# Patient Record
Sex: MALE | Race: WHITE | NOT HISPANIC OR LATINO | Employment: FULL TIME | URBAN - METROPOLITAN AREA
[De-identification: names, ages, dates, MRNs, and addresses within clinical notes are randomized per-mention and may not be internally consistent; named-entity substitution may affect disease eponyms.]

---

## 2024-07-10 ENCOUNTER — HOSPITAL ENCOUNTER (EMERGENCY)
Facility: CLINIC | Age: 24
Discharge: HOME | End: 2024-07-11
Attending: EMERGENCY MEDICINE | Admitting: EMERGENCY MEDICINE
Payer: COMMERCIAL

## 2024-07-10 VITALS
SYSTOLIC BLOOD PRESSURE: 117 MMHG | BODY MASS INDEX: 32.01 KG/M2 | OXYGEN SATURATION: 99 % | WEIGHT: 236.33 LBS | HEART RATE: 88 BPM | HEIGHT: 72 IN | DIASTOLIC BLOOD PRESSURE: 67 MMHG | RESPIRATION RATE: 18 BRPM | TEMPERATURE: 97.1 F

## 2024-07-10 DIAGNOSIS — K52.9 ACUTE GASTROENTERITIS: ICD-10-CM

## 2024-07-10 DIAGNOSIS — E86.0 DEHYDRATION: ICD-10-CM

## 2024-07-10 LAB
ALBUMIN SERPL BCG-MCNC: 4.9 G/DL (ref 3.5–5.2)
ALBUMIN UR-MCNC: NEGATIVE MG/DL
ALP SERPL-CCNC: 76 U/L (ref 40–150)
ALT SERPL W P-5'-P-CCNC: 44 U/L (ref 0–70)
ANION GAP SERPL CALCULATED.3IONS-SCNC: 11 MMOL/L (ref 7–15)
APPEARANCE UR: CLEAR
AST SERPL W P-5'-P-CCNC: 30 U/L (ref 0–45)
BASOPHILS # BLD AUTO: 0.1 10E3/UL (ref 0–0.2)
BASOPHILS NFR BLD AUTO: 0 %
BILIRUB SERPL-MCNC: 0.9 MG/DL
BILIRUB UR QL STRIP: NEGATIVE
BUN SERPL-MCNC: 17.3 MG/DL (ref 6–20)
CALCIUM SERPL-MCNC: 9.7 MG/DL (ref 8.6–10)
CHLORIDE SERPL-SCNC: 98 MMOL/L (ref 98–107)
COLOR UR AUTO: NORMAL
CREAT SERPL-MCNC: 1.11 MG/DL (ref 0.67–1.17)
DEPRECATED HCO3 PLAS-SCNC: 26 MMOL/L (ref 22–29)
EGFRCR SERPLBLD CKD-EPI 2021: >90 ML/MIN/1.73M2
EOSINOPHIL # BLD AUTO: 0.1 10E3/UL (ref 0–0.7)
EOSINOPHIL NFR BLD AUTO: 1 %
ERYTHROCYTE [DISTWIDTH] IN BLOOD BY AUTOMATED COUNT: 11.7 % (ref 10–15)
FLUAV RNA SPEC QL NAA+PROBE: NEGATIVE
FLUBV RNA RESP QL NAA+PROBE: NEGATIVE
GLUCOSE SERPL-MCNC: 114 MG/DL (ref 70–99)
GLUCOSE UR STRIP-MCNC: NEGATIVE MG/DL
HCT VFR BLD AUTO: 51.2 % (ref 40–53)
HGB BLD-MCNC: 18.2 G/DL (ref 13.3–17.7)
HGB UR QL STRIP: NEGATIVE
HOLD SPECIMEN: NORMAL
HOLD SPECIMEN: NORMAL
IMM GRANULOCYTES # BLD: 0 10E3/UL
IMM GRANULOCYTES NFR BLD: 0 %
KETONES UR STRIP-MCNC: NEGATIVE MG/DL
LEUKOCYTE ESTERASE UR QL STRIP: NEGATIVE
LIPASE SERPL-CCNC: 25 U/L (ref 13–60)
LYMPHOCYTES # BLD AUTO: 0.7 10E3/UL (ref 0.8–5.3)
LYMPHOCYTES NFR BLD AUTO: 5 %
MCH RBC QN AUTO: 31.5 PG (ref 26.5–33)
MCHC RBC AUTO-ENTMCNC: 35.5 G/DL (ref 31.5–36.5)
MCV RBC AUTO: 89 FL (ref 78–100)
MONOCYTES # BLD AUTO: 0.9 10E3/UL (ref 0–1.3)
MONOCYTES NFR BLD AUTO: 7 %
NEUTROPHILS # BLD AUTO: 12.5 10E3/UL (ref 1.6–8.3)
NEUTROPHILS NFR BLD AUTO: 88 %
NITRATE UR QL: NEGATIVE
NRBC # BLD AUTO: 0 10E3/UL
NRBC BLD AUTO-RTO: 0 /100
PH UR STRIP: 7 [PH] (ref 5–7)
PLATELET # BLD AUTO: 276 10E3/UL (ref 150–450)
POTASSIUM SERPL-SCNC: 4.7 MMOL/L (ref 3.4–5.3)
PROT SERPL-MCNC: 8 G/DL (ref 6.4–8.3)
RBC # BLD AUTO: 5.77 10E6/UL (ref 4.4–5.9)
RBC URINE: 1 /HPF
RSV RNA SPEC NAA+PROBE: NEGATIVE
SARS-COV-2 RNA RESP QL NAA+PROBE: NEGATIVE
SODIUM SERPL-SCNC: 135 MMOL/L (ref 135–145)
SP GR UR STRIP: 1.03 (ref 1–1.03)
UROBILINOGEN UR STRIP-MCNC: NORMAL MG/DL
WBC # BLD AUTO: 14.3 10E3/UL (ref 4–11)
WBC URINE: <1 /HPF

## 2024-07-10 PROCEDURE — 99284 EMERGENCY DEPT VISIT MOD MDM: CPT | Mod: 25

## 2024-07-10 PROCEDURE — 250N000011 HC RX IP 250 OP 636: Performed by: EMERGENCY MEDICINE

## 2024-07-10 PROCEDURE — 81001 URINALYSIS AUTO W/SCOPE: CPT | Performed by: EMERGENCY MEDICINE

## 2024-07-10 PROCEDURE — 81001 URINALYSIS AUTO W/SCOPE: CPT | Performed by: STUDENT IN AN ORGANIZED HEALTH CARE EDUCATION/TRAINING PROGRAM

## 2024-07-10 PROCEDURE — 83690 ASSAY OF LIPASE: CPT | Performed by: EMERGENCY MEDICINE

## 2024-07-10 PROCEDURE — 96361 HYDRATE IV INFUSION ADD-ON: CPT

## 2024-07-10 PROCEDURE — 80053 COMPREHEN METABOLIC PANEL: CPT | Performed by: EMERGENCY MEDICINE

## 2024-07-10 PROCEDURE — 80053 COMPREHEN METABOLIC PANEL: CPT | Performed by: STUDENT IN AN ORGANIZED HEALTH CARE EDUCATION/TRAINING PROGRAM

## 2024-07-10 PROCEDURE — 83690 ASSAY OF LIPASE: CPT | Performed by: STUDENT IN AN ORGANIZED HEALTH CARE EDUCATION/TRAINING PROGRAM

## 2024-07-10 PROCEDURE — 85025 COMPLETE CBC W/AUTO DIFF WBC: CPT | Performed by: EMERGENCY MEDICINE

## 2024-07-10 PROCEDURE — 87637 SARSCOV2&INF A&B&RSV AMP PRB: CPT | Performed by: EMERGENCY MEDICINE

## 2024-07-10 PROCEDURE — 258N000003 HC RX IP 258 OP 636: Performed by: EMERGENCY MEDICINE

## 2024-07-10 PROCEDURE — 36415 COLL VENOUS BLD VENIPUNCTURE: CPT | Performed by: STUDENT IN AN ORGANIZED HEALTH CARE EDUCATION/TRAINING PROGRAM

## 2024-07-10 PROCEDURE — 87637 SARSCOV2&INF A&B&RSV AMP PRB: CPT | Performed by: STUDENT IN AN ORGANIZED HEALTH CARE EDUCATION/TRAINING PROGRAM

## 2024-07-10 PROCEDURE — 96374 THER/PROPH/DIAG INJ IV PUSH: CPT

## 2024-07-10 PROCEDURE — 96375 TX/PRO/DX INJ NEW DRUG ADDON: CPT

## 2024-07-10 PROCEDURE — 250N000011 HC RX IP 250 OP 636: Performed by: STUDENT IN AN ORGANIZED HEALTH CARE EDUCATION/TRAINING PROGRAM

## 2024-07-10 PROCEDURE — 85025 COMPLETE CBC W/AUTO DIFF WBC: CPT | Performed by: STUDENT IN AN ORGANIZED HEALTH CARE EDUCATION/TRAINING PROGRAM

## 2024-07-10 RX ORDER — ONDANSETRON 4 MG/1
4 TABLET, ORALLY DISINTEGRATING ORAL ONCE
Status: COMPLETED | OUTPATIENT
Start: 2024-07-10 | End: 2024-07-10

## 2024-07-10 RX ORDER — ONDANSETRON 2 MG/ML
4 INJECTION INTRAMUSCULAR; INTRAVENOUS ONCE
Status: COMPLETED | OUTPATIENT
Start: 2024-07-10 | End: 2024-07-10

## 2024-07-10 RX ORDER — ONDANSETRON 4 MG/1
4 TABLET, ORALLY DISINTEGRATING ORAL EVERY 6 HOURS PRN
Qty: 10 TABLET | Refills: 0 | Status: SHIPPED | OUTPATIENT
Start: 2024-07-10 | End: 2024-07-13

## 2024-07-10 RX ADMIN — ONDANSETRON 4 MG: 4 TABLET, ORALLY DISINTEGRATING ORAL at 21:00

## 2024-07-10 RX ADMIN — SODIUM CHLORIDE 1000 ML: 9 INJECTION, SOLUTION INTRAVENOUS at 23:42

## 2024-07-10 RX ADMIN — ONDANSETRON 4 MG: 2 INJECTION INTRAMUSCULAR; INTRAVENOUS at 23:42

## 2024-07-10 ASSESSMENT — COLUMBIA-SUICIDE SEVERITY RATING SCALE - C-SSRS
6. HAVE YOU EVER DONE ANYTHING, STARTED TO DO ANYTHING, OR PREPARED TO DO ANYTHING TO END YOUR LIFE?: NO
1. IN THE PAST MONTH, HAVE YOU WISHED YOU WERE DEAD OR WISHED YOU COULD GO TO SLEEP AND NOT WAKE UP?: NO
2. HAVE YOU ACTUALLY HAD ANY THOUGHTS OF KILLING YOURSELF IN THE PAST MONTH?: NO

## 2024-07-10 ASSESSMENT — ACTIVITIES OF DAILY LIVING (ADL): ADLS_ACUITY_SCORE: 33

## 2024-07-11 PROCEDURE — 96361 HYDRATE IV INFUSION ADD-ON: CPT

## 2024-07-11 PROCEDURE — 250N000011 HC RX IP 250 OP 636: Performed by: EMERGENCY MEDICINE

## 2024-07-11 PROCEDURE — 258N000003 HC RX IP 258 OP 636: Performed by: EMERGENCY MEDICINE

## 2024-07-11 RX ORDER — METOCLOPRAMIDE HYDROCHLORIDE 5 MG/ML
10 INJECTION INTRAMUSCULAR; INTRAVENOUS ONCE
Status: COMPLETED | OUTPATIENT
Start: 2024-07-11 | End: 2024-07-11

## 2024-07-11 RX ADMIN — METOCLOPRAMIDE 10 MG: 5 INJECTION, SOLUTION INTRAMUSCULAR; INTRAVENOUS at 00:24

## 2024-07-11 RX ADMIN — SODIUM CHLORIDE 1000 ML: 9 INJECTION, SOLUTION INTRAVENOUS at 00:24

## 2024-07-11 ASSESSMENT — ACTIVITIES OF DAILY LIVING (ADL): ADLS_ACUITY_SCORE: 35

## 2024-07-11 NOTE — ED PROVIDER NOTES
Emergency Department Note      History of Present Illness     Chief Complaint   Abdominal Pain and Nausea, Vomiting, & Diarrhea    HPI   Brian Collins is a 23 year old male who presents to the emergency department with his mother for evaluation of abdominal pain, vomiting, and diarrhea. The patient reports waking up this morning and having left sided abdominal pain which radiates across to his right side. He denies any recent travel, camping, or history of abdominal surgeries.     Independent Historian   None    Review of External Notes   I reviewed the patient's care everywhere and updated epic.     Past Medical History     Medical History and Problem List   Past Medical History:   Diagnosis Date    Bipolar 1 disorder      Medications   None    Surgical History   Past Surgical History:   Procedure Laterality Date    Hand/finger surgery Right        Physical Exam     Patient Vitals for the past 24 hrs:   BP Temp Temp src Pulse Resp SpO2 Height Weight   07/10/24 2055 117/67 97.1  F (36.2  C) Temporal 88 18 99 % 1.829 m (6') 107.2 kg (236 lb 5.3 oz)     Physical Exam  Nursing note and vitals reviewed.  Constitutional: Cooperative.   HENT:   Mouth/Throat: Mucous membranes are dry.   Cardiovascular: Normal rate, regular rhythm and normal heart sounds.  No murmur.  Pulmonary/Chest: Effort normal and breath sounds normal. No respiratory distress. No wheezes.   Abdominal: Soft. Normal appearance and bowel sounds are normal. No distension. There is no tenderness. There is no rigidity and no guarding.   Neurological: Alert.  Oriented x 3  Skin: Skin is warm and dry.   Psychiatric: Normal mood and affect.       Diagnostics     Lab Results   Labs Ordered and Resulted from Time of ED Arrival to Time of ED Departure   COMPREHENSIVE METABOLIC PANEL - Abnormal       Result Value    Sodium 135      Potassium 4.7      Carbon Dioxide (CO2) 26      Anion Gap 11      Urea Nitrogen 17.3      Creatinine 1.11      GFR Estimate >90       Calcium 9.7      Chloride 98      Glucose 114 (*)     Alkaline Phosphatase 76      AST 30      ALT 44      Protein Total 8.0      Albumin 4.9      Bilirubin Total 0.9     CBC WITH PLATELETS AND DIFFERENTIAL - Abnormal    WBC Count 14.3 (*)     RBC Count 5.77      Hemoglobin 18.2 (*)     Hematocrit 51.2      MCV 89      MCH 31.5      MCHC 35.5      RDW 11.7      Platelet Count 276      % Neutrophils 88      % Lymphocytes 5      % Monocytes 7      % Eosinophils 1      % Basophils 0      % Immature Granulocytes 0      NRBCs per 100 WBC 0      Absolute Neutrophils 12.5 (*)     Absolute Lymphocytes 0.7 (*)     Absolute Monocytes 0.9      Absolute Eosinophils 0.1      Absolute Basophils 0.1      Absolute Immature Granulocytes 0.0      Absolute NRBCs 0.0     ROUTINE UA WITH MICROSCOPIC REFLEX TO CULTURE - Normal    Color Urine Light Yellow      Appearance Urine Clear      Glucose Urine Negative      Bilirubin Urine Negative      Ketones Urine Negative      Specific Gravity Urine 1.034      Blood Urine Negative      pH Urine 7.0      Protein Albumin Urine Negative      Urobilinogen Urine Normal      Nitrite Urine Negative      Leukocyte Esterase Urine Negative      RBC Urine 1      WBC Urine <1     LIPASE - Normal    Lipase 25     INFLUENZA A/B, RSV, & SARS-COV2 PCR - Normal    Influenza A PCR Negative      Influenza B PCR Negative      RSV PCR Negative      SARS CoV2 PCR Negative       Imaging   No orders to display     Independent Interpretation   None    ED Course      Medications Administered   Medications   ondansetron (ZOFRAN ODT) ODT tab 4 mg (4 mg Oral $Given 7/10/24 2100)   sodium chloride 0.9% BOLUS 1,000 mL (0 mLs Intravenous Stopped 7/11/24 0019)   ondansetron (ZOFRAN) injection 4 mg (4 mg Intravenous $Given 7/10/24 2342)   metoclopramide (REGLAN) injection 10 mg (10 mg Intravenous $Given 7/11/24 0024)   sodium chloride 0.9% BOLUS 1,000 mL (0 mLs Intravenous Stopped 7/11/24 0102)     Discussion of Management    None    ED Course   ED Course as of 07/11/24 0104   Wed Jul 10, 2024   2337 I obtained history and examined the patient as noted above     Thu Jul 11, 2024 0104 I rechecked the patient and explained findings. Patient was comfortable with discharge.      Optional/Additional Documentation  None    Medical Decision Making / Diagnosis     [facility]   Brian Collins is a 23 year old male Brian Collins is a 23 year old male who presents for evaluation of nausea, vomiting and diarrhea with mild abdominal pain in a nonfocal abdominal exam.  I considered a broad differential diagnosis for this patient including viral gastroenteritis, bacterial infection of the large intestine (salmonella, shigella, campylobacter, e coli, etc), bowel obstruction, intra-abdominal infection such as colitis, food poisoning, cholecystitis, UTI, pyelonephritis, appendicitis, etc.  There are no signs of worrisome intra-abdominal pathologies detected during the visit today.  He has a completely benign abdominal exam without rebound, guarding, or marked tenderness to palpation.  Supportive outpatient management is therefore indicated.  Abdominal pain precautions are given for home.   No indication for stool studies at this time.  No indication for CT at this time.  It was discussed to return to the ED for blood in stool, increasing pain, or fevers more than 102.   Feels much improved after interventions in ED.     Disposition   The patient was discharged.     Diagnosis     ICD-10-CM    1. Acute gastroenteritis  K52.9       2. Dehydration  E86.0            Discharge Medications   New Prescriptions    ONDANSETRON (ZOFRAN ODT) 4 MG ODT TAB    Take 1 tablet (4 mg) by mouth every 6 hours as needed for nausea     Scribe Disclosure:  Ana Cristina HEALY, am serving as a scribe at 12:40 AM on 7/11/2024 to document services personally performed by Mendoza Arriaza MD based on my observations and the provider's statements to me.        Mendoza Arriaza MD  07/11/24  [id number]

## 2024-12-28 ENCOUNTER — HOSPITAL ENCOUNTER (EMERGENCY)
Facility: CLINIC | Age: 24
End: 2024-12-28
Attending: EMERGENCY MEDICINE
Payer: COMMERCIAL

## 2024-12-28 ENCOUNTER — TELEPHONE (OUTPATIENT)
Dept: BEHAVIORAL HEALTH | Facility: CLINIC | Age: 24
End: 2024-12-28

## 2024-12-28 DIAGNOSIS — F23 ACUTE PSYCHOSIS (H): ICD-10-CM

## 2024-12-28 PROBLEM — F31.10 BIPOLAR AFFECTIVE DISORDER, CURRENT EPISODE MANIC (H): Status: ACTIVE | Noted: 2024-12-28

## 2024-12-28 PROBLEM — F29 PSYCHOSIS (H): Status: ACTIVE | Noted: 2024-12-28

## 2024-12-28 LAB
AMPHETAMINES UR QL SCN: NORMAL
BARBITURATES UR QL SCN: NORMAL
BENZODIAZ UR QL SCN: NORMAL
BZE UR QL SCN: NORMAL
CANNABINOIDS UR QL SCN: NORMAL
FENTANYL UR QL: NORMAL
OPIATES UR QL SCN: NORMAL
PCP QUAL URINE (ROCHE): NORMAL

## 2024-12-28 PROCEDURE — 80307 DRUG TEST PRSMV CHEM ANLYZR: CPT | Performed by: EMERGENCY MEDICINE

## 2024-12-28 PROCEDURE — 99285 EMERGENCY DEPT VISIT HI MDM: CPT | Performed by: EMERGENCY MEDICINE

## 2024-12-28 PROCEDURE — 250N000013 HC RX MED GY IP 250 OP 250 PS 637: Performed by: EMERGENCY MEDICINE

## 2024-12-28 RX ORDER — HYDROXYZINE HYDROCHLORIDE 50 MG/1
50 TABLET, FILM COATED ORAL EVERY 6 HOURS PRN
Status: DISCONTINUED | OUTPATIENT
Start: 2024-12-28 | End: 2025-01-04 | Stop reason: HOSPADM

## 2024-12-28 RX ORDER — IBUPROFEN 600 MG/1
600 TABLET, FILM COATED ORAL EVERY 6 HOURS PRN
Status: DISCONTINUED | OUTPATIENT
Start: 2024-12-28 | End: 2025-01-04 | Stop reason: HOSPADM

## 2024-12-28 RX ORDER — OLANZAPINE 10 MG/1
10 TABLET, ORALLY DISINTEGRATING ORAL DAILY PRN
Status: DISCONTINUED | OUTPATIENT
Start: 2024-12-28 | End: 2025-01-04 | Stop reason: HOSPADM

## 2024-12-28 RX ORDER — ACETAMINOPHEN 325 MG/1
650 TABLET ORAL EVERY 4 HOURS PRN
Status: DISCONTINUED | OUTPATIENT
Start: 2024-12-28 | End: 2025-01-04 | Stop reason: HOSPADM

## 2024-12-28 RX ORDER — BUPROPION HYDROCHLORIDE 150 MG/1
1 TABLET ORAL EVERY MORNING
Status: ON HOLD | COMMUNITY
Start: 2024-12-10

## 2024-12-28 RX ORDER — LAMOTRIGINE 25 MG/1
1 TABLET ORAL
COMMUNITY
Start: 2024-08-31 | End: 2024-12-28

## 2024-12-28 RX ORDER — LAMOTRIGINE 100 MG/1
0.5 TABLET ORAL AT BEDTIME
Status: ON HOLD | COMMUNITY
Start: 2024-12-10

## 2024-12-28 RX ORDER — OLANZAPINE 10 MG/2ML
10 INJECTION, POWDER, FOR SOLUTION INTRAMUSCULAR ONCE
Status: COMPLETED | OUTPATIENT
Start: 2024-12-28 | End: 2024-12-28

## 2024-12-28 RX ORDER — LAMOTRIGINE 25 MG/1
25 TABLET ORAL DAILY
Status: DISCONTINUED | OUTPATIENT
Start: 2024-12-28 | End: 2024-12-28

## 2024-12-28 RX ORDER — QUETIAPINE FUMARATE 25 MG/1
25-50 TABLET, FILM COATED ORAL
Status: ON HOLD | COMMUNITY
Start: 2024-12-24

## 2024-12-28 RX ORDER — OLANZAPINE 10 MG/1
10 TABLET, ORALLY DISINTEGRATING ORAL 2 TIMES DAILY PRN
Status: DISCONTINUED | OUTPATIENT
Start: 2024-12-28 | End: 2025-01-04 | Stop reason: HOSPADM

## 2024-12-28 RX ORDER — BUPROPION HYDROCHLORIDE 150 MG/1
150 TABLET ORAL DAILY
Status: DISCONTINUED | OUTPATIENT
Start: 2024-12-29 | End: 2025-01-04 | Stop reason: HOSPADM

## 2024-12-28 RX ORDER — QUETIAPINE FUMARATE 100 MG/1
200 TABLET, FILM COATED ORAL ONCE
Status: COMPLETED | OUTPATIENT
Start: 2024-12-28 | End: 2024-12-28

## 2024-12-28 RX ORDER — LAMOTRIGINE 25 MG/1
50 TABLET ORAL AT BEDTIME
Status: DISCONTINUED | OUTPATIENT
Start: 2024-12-28 | End: 2025-01-04 | Stop reason: HOSPADM

## 2024-12-28 RX ADMIN — HYDROXYZINE HYDROCHLORIDE 50 MG: 50 TABLET ORAL at 20:06

## 2024-12-28 RX ADMIN — OLANZAPINE 10 MG: 10 TABLET, ORALLY DISINTEGRATING ORAL at 20:06

## 2024-12-28 RX ADMIN — QUETIAPINE FUMARATE 200 MG: 100 TABLET ORAL at 22:11

## 2024-12-28 RX ADMIN — NICOTINE POLACRILEX 2 MG: 2 GUM, CHEWING ORAL at 20:59

## 2024-12-28 RX ADMIN — OLANZAPINE 10 MG: 10 TABLET, ORALLY DISINTEGRATING ORAL at 22:17

## 2024-12-28 RX ADMIN — Medication 3 MG: at 22:11

## 2024-12-28 RX ADMIN — NICOTINE POLACRILEX 2 MG: 2 GUM, CHEWING ORAL at 22:11

## 2024-12-28 RX ADMIN — LAMOTRIGINE 50 MG: 25 TABLET ORAL at 22:11

## 2024-12-28 ASSESSMENT — COLUMBIA-SUICIDE SEVERITY RATING SCALE - C-SSRS
6. HAVE YOU EVER DONE ANYTHING, STARTED TO DO ANYTHING, OR PREPARED TO DO ANYTHING TO END YOUR LIFE?: NO
5. HAVE YOU STARTED TO WORK OUT OR WORKED OUT THE DETAILS OF HOW TO KILL YOURSELF? DO YOU INTEND TO CARRY OUT THIS PLAN?: NO
1. IN THE PAST MONTH, HAVE YOU WISHED YOU WERE DEAD OR WISHED YOU COULD GO TO SLEEP AND NOT WAKE UP?: YES
2. HAVE YOU ACTUALLY HAD ANY THOUGHTS OF KILLING YOURSELF IN THE PAST MONTH?: YES
3. HAVE YOU BEEN THINKING ABOUT HOW YOU MIGHT KILL YOURSELF?: NO
4. HAVE YOU HAD THESE THOUGHTS AND HAD SOME INTENTION OF ACTING ON THEM?: NO

## 2024-12-28 ASSESSMENT — ACTIVITIES OF DAILY LIVING (ADL)
ADLS_ACUITY_SCORE: 41

## 2024-12-28 NOTE — ED NOTES
Writer spoke with pt's mother who expressed extreme concern for her son's wellbeing. Yesterday pt contacted psychiatrist about increased leigh ann and was instructed to increase his dose of Seroquel. This morning pt was having grandiose thoughts, wanting to use the sun to blow up everyone in the world, and wanting to talk about God with mother, which is uncharacteristic. Pt's mother also said he had aggressive outbursts, punching the dashboard and windows in the vehicle on the way to ED. Mother now at bedside with it.

## 2024-12-28 NOTE — ED TRIAGE NOTES
"      Pt refuses to put on wristband.     Pt states \"I want to suffer\". Pt admits to SI with no active plan;  No previous attempts;  pt admits to occasional paranoia and hallucinations;   When Pt states how long he has been feeling ill, pt states \"I don't like time\".  Pt states \"I like to lie, I don't like to be open and honest and when they ask questions, I'll try to lie\".     Hx of bipolar I      "

## 2024-12-28 NOTE — ED PROVIDER NOTES
"ED Provider Note  [facility]      History     Chief Complaint   Patient presents with    Suicidal    Paranoid     HPI    Brian Collins is a 24 year old male with a history of bipolar 1 who presents to the emergency department today reportedly for mental health evaluation.  Record review shows that he does have a history of bipolar 1, there is at least one previous psychiatric hospitalization in 2021.  He reports he is on Wellbutrin, and Lamictal which he is compliant with.  He is on as needed Seroquel.  Patient states that he does not know why he is here.  He is extraordinarily vague in almost all of his answers to the questions.  He states that he was brought here by his mother because \"I guess she wants me committed \".  When asked why he thinks that, he cannot answer the question.  Patient states that he \"wants to suffer\" and when asked time questions about how long he has not been feeling well, his answers \"I don't like time \".  He admits to suicidal ideation but does not have any active plan.  When asked how long he has been sick feeling like this, he says \"for as long as the world's been turning\".  Patient states that he is here because \"I want to do the right thing\".  When asked what that means he says \"right, left, that is more obvious than right and wrong\".  When asked if he is having hallucinations, he says \"yes all the time\".  He admits to having command hallucinations but will not tell me what the voices are telling him.  When asked if he has any thoughts of wanting to harm other people, he says \"yes\" but he will not identify a specific person.  He does not identify any particular reason why he feels ill will toward others.  He denies any alcohol or drug use.  The rest of the history is largely nonsensical and noncontributory.      He reports he has a psychiatrist that he sees through [facility] but does not have a therapist.    Per DEC , he hasn't slept " "for 3-4 days. Has had pressured speech, very restless, agitated and yelling for the past 3 days including throwing things, pounding on the passenger side window while mom was driving him here to the ED. He described \"chaos in the world\" when talking to the DEC , referenced trying to \"untangle things in my mind\". Said he is hearing voices that tell him to \"sort out the chaos in my own head and that the world is unstable\". \"Get into a mode of rage\". Mom says he has been unusually agitated over the past several days. Sometimes voices whisper and sometimes they are loud. Called in sick to work today. Mom is going to ask his job to provide [employer] paperwork so he can work on [employer]. Mom called police twice in the past week.     This part of the medical record was transcribed by Nahid Santana, Medical Scribe, from a dictation done by Supriya Cohen MD.     Past Medical History:   Diagnosis Date    Bipolar 1 disorder        Past Surgical History:   Procedure Laterality Date    Hand/finger surgery Right        History reviewed. No pertinent family history.    Social History     Tobacco Use    Smoking status: Never    Smokeless tobacco: Current     Types: Chew   Substance Use Topics    Alcohol use: Not Currently         Past Medical History  Past Medical History:   Diagnosis Date    Bipolar 1 disorder      Past Surgical History:   Procedure Laterality Date    Hand/finger surgery Right      buPROPion (WELLBUTRIN XL) 150 MG 24 hr tablet  lamoTRIgine (LAMICTAL) 100 MG tablet  QUEtiapine (SEROQUEL) 25 MG tablet      No Known Allergies  Family History  History reviewed. No pertinent family history.  Social History   Social History     Tobacco Use    Smoking status: Never    Smokeless tobacco: Current     Types: Chew   Substance Use Topics    Alcohol use: Not Currently    Drug use: Not Currently      A medically appropriate review of systems was performed with pertinent positives and negatives noted in the HPI, and all " "other systems negative.    Physical Exam   BP: (!) 158/115  Pulse: 115  Temp: 98.7  F (37.1  C)  Resp: 20  SpO2: 97 %  Physical Exam  Vitals and nursing note reviewed.   Constitutional:       General: He is not in acute distress.     Appearance: He is not diaphoretic.      Comments: Adult male, sitting in bed, alert,  reactive affect but vague and tangential with answers.   HENT:      Head: Atraumatic.      Mouth/Throat:      Mouth: Mucous membranes are moist.      Pharynx: Oropharynx is clear. No oropharyngeal exudate.   Eyes:      General: No scleral icterus.     Pupils: Pupils are equal, round, and reactive to light.   Cardiovascular:      Rate and Rhythm: Tachycardia present.      Pulses: Normal pulses.      Heart sounds: No murmur heard.  Pulmonary:      Effort: Pulmonary effort is normal.      Breath sounds: Normal breath sounds.   Abdominal:      General: Bowel sounds are normal.      Palpations: Abdomen is soft.      Tenderness: There is no abdominal tenderness.   Musculoskeletal:         General: No tenderness.   Skin:     General: Skin is warm.      Findings: No rash.   Neurological:      General: No focal deficit present.   Psychiatric:      Comments: Unremarkable appearance.  Not agitated or aggressive.  Vague, tangential, guarded with answers.  Sometimes answers are quite concrete, for instance when asked about what the \"right thing\" is (he made mention of \"wanting to do the right thing\"), he starts referencing right and left directions and points to the wall (on his right) as the \"right\" one as opposed to the \"left\" one.   Irritable at times.  SI without plan.  Questionable HI - will not further discuss this.  Admits to  but will not disclose further details about this.  Does not actively appear to be attending to internal stimuli at this time.  Distractible, particularly when the patient next to him in the hallway starts asking questions and making demands, he starts referencing her and not " answering questions we are specifically asking him about his symptoms.           ED Course, Procedures, & Data      Procedures               Results for orders placed or performed during the hospital encounter of 12/28/24   Urine Drug Screen Panel     Status: Normal   Result Value Ref Range    Amphetamines Urine Screen Negative Screen Negative    Barbituates Urine Screen Negative Screen Negative    Benzodiazepine Urine Screen Negative Screen Negative    Cannabinoids Urine Screen Negative Screen Negative    Cocaine Urine Screen Negative Screen Negative    Fentanyl Qual Urine Screen Negative Screen Negative    Opiates Urine Screen Negative Screen Negative    PCP Urine Screen Negative Screen Negative   Urine Drug Screen     Status: Normal    Narrative    The following orders were created for panel order Urine Drug Screen.  Procedure                               Abnormality         Status                     ---------                               -----------         ------                     Urine Drug Screen Panel[[id number]]      Normal              Final result                 Please view results for these tests on the individual orders.     Medications   acetaminophen (TYLENOL) tablet 650 mg (has no administration in time range)   ibuprofen (ADVIL/MOTRIN) tablet 600 mg (has no administration in time range)   hydrOXYzine HCl (ATARAX) tablet 50 mg (50 mg Oral $Given 12/28/24 2006)   melatonin tablet 3 mg (3 mg Oral $Given 12/28/24 2211)   OLANZapine zydis (zyPREXA) ODT tab 10 mg ( Oral See Alternative 12/28/24 2217)     Or   OLANZapine zydis (zyPREXA) ODT tab 10 mg (10 mg Oral $Given 12/28/24 2217)   buPROPion (WELLBUTRIN XL) 24 hr tablet 150 mg (has no administration in time range)   lamoTRIgine (LaMICtal) tablet 50 mg (50 mg Oral $Given 12/28/24 2211)   nicotine (NICORETTE) gum 2 mg (2 mg Buccal $Given 12/28/24 2211)   OLANZapine (zyPREXA) injection 10 mg (10 mg Intramuscular Not Given 12/28/24 2127)   QUEtiapine  (SEROquel) tablet 200 mg (200 mg Oral $Given 12/28/24 8468)     Labs Ordered and Resulted from Time of ED Arrival to Time of ED Departure   URINE DRUG SCREEN PANEL - Normal       Result Value    Amphetamines Urine Screen Negative      Barbituates Urine Screen Negative      Benzodiazepine Urine Screen Negative      Cannabinoids Urine Screen Negative      Cocaine Urine Screen Negative      Fentanyl Qual Urine Screen Negative      Opiates Urine Screen Negative      PCP Urine Screen Negative       No orders to display          Critical care was not performed.     Medical Decision Making  The patient's presentation was of high complexity (a chronic illness severe exacerbation, progression, or side effect of treatment).    The patient's evaluation involved:  review of external note(s) from 1 sources (see separate area of note for details)  ordering and/or review of 2 test(s) in this encounter (see separate area of note for details)  discussion of management or test interpretation with another health professional (see separate area of note for details)    The patient's management necessitated high risk (a decision regarding hospitalization).    Assessment & Plan    Patient presents with the above complaints.  On exam, he is alert and cooperative but vague, tangential, likely minimizing.  We did have the DEC  see the patient, please see their note for full details.      Patient does appear to be acutely psychotic, decompensated, and I do believe that it would be in his best interest to be admitted to the hospital.  He was initially agreeable, but subsequently informed staff that he did not want to stay and wanted to leave.  We have placed him on a 72-hour hold as I believe he is not safe at this time given his acute decompensation.  He has been given Zyprexa here in the emergency department as well as a dose of Seroquel.  I have placed psychiatry consult for the morning in anticipation of long wait time for  inpatient mental health bed.    I have reviewed the nursing notes. I have reviewed the findings, diagnosis, plan and need for follow up with the patient.    New Prescriptions    No medications on file       Final diagnoses:   Acute psychosis (H)       Supriya Cohen MD  [facility] EMERGENCY DEPARTMENT  12/28/2024     Supriya Cohen MD  12/29/24 0114

## 2024-12-29 ENCOUNTER — TELEPHONE (OUTPATIENT)
Dept: BEHAVIORAL HEALTH | Facility: CLINIC | Age: 24
End: 2024-12-29
Payer: COMMERCIAL

## 2024-12-29 PROCEDURE — 250N000013 HC RX MED GY IP 250 OP 250 PS 637: Performed by: EMERGENCY MEDICINE

## 2024-12-29 PROCEDURE — 250N000011 HC RX IP 250 OP 636: Performed by: EMERGENCY MEDICINE

## 2024-12-29 PROCEDURE — 99245 OFF/OP CONSLTJ NEW/EST HI 55: CPT | Performed by: NURSE PRACTITIONER

## 2024-12-29 PROCEDURE — 96372 THER/PROPH/DIAG INJ SC/IM: CPT | Performed by: EMERGENCY MEDICINE

## 2024-12-29 PROCEDURE — 99417 PROLNG OP E/M EACH 15 MIN: CPT | Performed by: NURSE PRACTITIONER

## 2024-12-29 RX ORDER — QUETIAPINE FUMARATE 100 MG/1
200 TABLET, FILM COATED ORAL AT BEDTIME
Status: DISCONTINUED | OUTPATIENT
Start: 2024-12-29 | End: 2024-12-30

## 2024-12-29 RX ORDER — LORAZEPAM 2 MG/ML
2 INJECTION INTRAMUSCULAR ONCE
Status: COMPLETED | OUTPATIENT
Start: 2024-12-29 | End: 2024-12-29

## 2024-12-29 RX ORDER — ZIPRASIDONE MESYLATE 20 MG/ML
20 INJECTION, POWDER, LYOPHILIZED, FOR SOLUTION INTRAMUSCULAR ONCE
Status: COMPLETED | OUTPATIENT
Start: 2024-12-29 | End: 2024-12-29

## 2024-12-29 RX ORDER — DIPHENHYDRAMINE HYDROCHLORIDE 50 MG/ML
50 INJECTION INTRAMUSCULAR; INTRAVENOUS ONCE
Status: COMPLETED | OUTPATIENT
Start: 2024-12-29 | End: 2024-12-29

## 2024-12-29 RX ADMIN — NICOTINE POLACRILEX 2 MG: 2 GUM, CHEWING ORAL at 22:43

## 2024-12-29 RX ADMIN — OLANZAPINE 10 MG: 10 TABLET, ORALLY DISINTEGRATING ORAL at 17:50

## 2024-12-29 RX ADMIN — NICOTINE POLACRILEX 2 MG: 2 GUM, CHEWING ORAL at 04:09

## 2024-12-29 RX ADMIN — HYDROXYZINE HYDROCHLORIDE 50 MG: 50 TABLET ORAL at 06:12

## 2024-12-29 RX ADMIN — LORAZEPAM 2 MG: 2 INJECTION INTRAMUSCULAR; INTRAVENOUS at 19:55

## 2024-12-29 RX ADMIN — LAMOTRIGINE 50 MG: 25 TABLET ORAL at 22:36

## 2024-12-29 RX ADMIN — BUPROPION HYDROCHLORIDE 150 MG: 150 TABLET, EXTENDED RELEASE ORAL at 08:46

## 2024-12-29 RX ADMIN — NICOTINE POLACRILEX 2 MG: 2 GUM, CHEWING ORAL at 15:36

## 2024-12-29 RX ADMIN — NICOTINE POLACRILEX 2 MG: 2 GUM, CHEWING ORAL at 18:48

## 2024-12-29 RX ADMIN — QUETIAPINE FUMARATE 200 MG: 100 TABLET ORAL at 22:36

## 2024-12-29 RX ADMIN — ZIPRASIDONE MESYLATE 20 MG: 20 INJECTION, POWDER, LYOPHILIZED, FOR SOLUTION INTRAMUSCULAR at 19:55

## 2024-12-29 RX ADMIN — NICOTINE POLACRILEX 2 MG: 2 GUM, CHEWING ORAL at 12:16

## 2024-12-29 RX ADMIN — DIPHENHYDRAMINE HYDROCHLORIDE 50 MG: 50 INJECTION, SOLUTION INTRAMUSCULAR; INTRAVENOUS at 19:55

## 2024-12-29 ASSESSMENT — ACTIVITIES OF DAILY LIVING (ADL)
ADLS_ACUITY_SCORE: 41

## 2024-12-29 NOTE — PHARMACY-ADMISSION MEDICATION HISTORY
Pharmacy Intern Admission Medication History    Admission medication history is complete. The information provided in this note is only as accurate as the sources available at the time of the update.    Information Source(s): Patient via in-person    Pertinent Information:  Patient reported that he is taking quetiapine half tab PO daily.    Changes made to PTA medication list:  Added: None  Deleted: confirmed per patient  lamoTRIgine (LAMICTAL) 25 MG tablet  Changed:   buPROPion (WELLBUTRIN XL) 150 MG 24 hr tablet  Sig: at 2pm --> QAM  lamoTRIgine (LAMICTAL) 100 MG tablet  Sig: at 2pm --> at bedtime  QUEtiapine (SEROQUEL) 25 MG tablet  Sig added: Take 12.5 mg by mouth daily.     Medication History Completed By: Jerome Kelly 12/28/2024 8:45 PM    PTA Med List   Medication Sig Last Dose/Taking    buPROPion (WELLBUTRIN XL) 150 MG 24 hr tablet Take 1 tablet by mouth every morning. 12/27/2024 Morning    lamoTRIgine (LAMICTAL) 100 MG tablet Take 0.5 tablets by mouth at bedtime. 12/27/2024 Bedtime    QUEtiapine (SEROQUEL) 25 MG tablet Take 12.5 mg by mouth daily. 12/27/2024 Bedtime

## 2024-12-29 NOTE — CONSULTS
Diagnostic Evaluation Consultation  Crisis Assessment    Patient Name: Brian Collins  Age:  24 year old  Legal Sex: male  Gender Identity: male  Pronouns:   Race: White  Ethnicity: Not  or   Language: English      Patient was assessed: In person   Crisis Assessment Start Date: 12/28/24  Crisis Assessment Start Time: 1903  Crisis Assessment Stop Time: 1934  Patient location: [facility] EMERGENCY DEPARTMENT                             [facility]    Referral Data and Chief Complaint  Brian Collins presents to the ED with family/friends. Patient is presenting to the ED for the following concerns: Verbal agitation, Physical aggression (but not inside the ED), Significant behavioral change, Anxiety, Suicidal ideation. Factors that make the mental health crisis life threatening or complex are: The patient was referred by [facility] due to concerns including hallucinations, disorganized thoughts, speech, agitation and suicidal ideation. Patient s speech is tangential, using specific repetitive phrases including  there is chaos in my mind and I m here to bring order to the chaos . Patient had not been able to sleep for 3-4 days. Patient endorses suicidal thoughts, stating that  I just want to suffer , but he struggles to put thoughts into words a plan or to elaborate on his statements. He kept saying  I just need to untangle the chaos in my mind . Patient endorses command hallucinations, telling him to  get into a mode of rage .  During the ride to the ED, the patient was agitated and repeatedly yelled and screamed while pounding with his fist repeatedly against the dashboard and the car s window. He states that he was responding to the voices in his head which made his mother afraid. Patient appears blunt, restless and anxious. Collateral source reports the patient was not feeling well on Friday night and seen by urgent care for adult mental health in [facility]. The attending ordered to  "increase Seroquel (prn) by 25 mg. The symptoms worsened, including more ongoing agitation, unable to sleep and being concerned about  chaos , being disruptive and restless. He made what appeared homicidal statements such as \"I want to take the sun and blow up everyolne\". They called [facility] who referred him to the ED. The patient denies delusions and homicidal ideation. The patient is seen by Dr Muir at [facility] for mediation management. It is unclear if the patient is taking medications regularly. Hx of substance use but not currently.       Informed Consent and Assessment Methods  Explained the crisis assessment process, including applicable information disclosures and limits to confidentiality, assessed understanding of the process, and obtained consent to proceed with the assessment.  Assessment methods included conducting a formal interview with patient, review of medical records, collaboration with medical staff, and obtaining relevant collateral information from family and community providers when available.  : done     History of the Crisis   Medical records indicate a prior dx of Bipolar D/O, depressive type. Hx of 3 prior mental health inpatient stays for similar presentation. Medical records indicate the following \"Previous  admissions: 2 previous admissions, both for leigh ann. First episode of leigh ann was spring 2018 at [facility], at which time patient had decreased need for sleep (2-3 hrs per night), increased energy and activities, risky behaviors (threw knives at a wall), thinking he was connected to spirits, thought he was possessed by the devil, and ideas of reference (special or double meanings from everyday conversation).\". Patient lives in house with 2 room mates. He has a full-time job. He called in sick on Friday. His mother will arrange for [facility] paperwork to be send to the hospital for completion. Patient does not have other treatment providers.    Brief Psychosocial " "History  Family:  Single, Children no  Support System:  Parent(s)  Employment Status:  employed full-time  Source of Income:  salary/wages  Financial Environmental Concerns:  none  Current Hobbies:   (Not assessed)  Barriers in Personal Life:  mental health concerns    Significant Clinical History  Current Anxiety Symptoms:  racing thoughts, anxious  Current Depression/Trauma:  thoughts of death/suicide, helplessness, irritable, sense of doom, negativistic  Current Somatic Symptoms:  anxious, racing thoughts, wandering  Current Psychosis/Thought Disturbance:  impulsive, hyperactive, hostile/aggressive, displaces blame, elated mood, agitation  Current Eating Symptoms:     Chemical Use History:  Alcohol: None  Benzodiazepines: None  Opiates: None  Cocaine: None  Marijuana: None  Other Use: None   Past diagnosis:  Bipolar Disorder  Family history:  Substance Use Disorder  Past treatment:  Individual therapy, Inpatient Hospitalization, Partial Hospitalization, Psychiatric Medication Management  Details of most recent treatment:  Psychiatry  Other relevant history:  Onset of symptoms at age 18. Patient attended college, [employer].    Have there been any medication changes in the past two weeks:  yes, please comment  Recent visit to the Urgent mental health clinic: Attending recommended increase in PRN Seroquel    Is the patient compliant with medications:   (It is unclear if the patient is compliant with medications.)        Collateral Information  Is there collateral information: Yes     Collateral information name, relationship, phone number:  Patient's mother, Massimo Rosenthal @ [phone]    What happened today: He has been struggling these past couple of days and I just could not leave him alone. He is agitated, having racing thoughts. He kept saying things like, I want to suffer so that I am not alive anymore\". We went to  urgent care last night, and they said he could increase the PRN Seroquel by 25 " "mg, but he is not getting better. We called [facility] again today, and was told to bring him to the ED. This is definitely a manic episode. We spent the day together, on & off. He wanted to show me his journal, but I did not read it. I just knew that something was not right.     What is different about patient's functioning: He is more agitated. He has not been like this in a long time. He made statements like \" I would take the sun and blow-up everyone\"; \"I just want to suffer, and not be alive anymore\". He stated that you only feel alive when you are dead.     What do you think the patient needs:      Has patient made comments about wanting to kill themselves/others: yes    If d/c is recommended, can they take part in safety/aftercare planning:  yes    Additional collateral information:        Risk Assessment  [place] Suicide Severity Rating Scale Full Clinical Version: The results of this screen might be impacted by the patient's current symptoms and presentation.  Suicidal Ideation  Q6 Suicide Behavior (Lifetime): no    [place] Suicide Severity Rating Scale Recent:   Suicidal Ideation (Recent)  Q1 Wished to be Dead (Past Month): no  Q2 Suicidal Thoughts (Past Month): no  Q3 Suicidal Thought Method: no  Q4 Suicidal Intent without Specific Plan: no  Q5 Suicide Intent with Specific Plan: no  Level of Risk per Screen: no risks indicated  Intensity of Ideation (Recent)  Description of Most Severe Ideation (Past 1 Month): Collateral source states that the patient reported that he wants to suffer; he only feels alive when you are dead  Suicidal Behavior (Recent)  Actual Attempt (Past 3 Months): No (unable to assess given patient's current psychosis)  Total Number of Actual Attempts (Past 3 Months): 0 (Unable to assess)  Actual Attempt Description (Past 3 Months): NA.  Has subject engaged in non-suicidal self-injurious behavior? (Past 3 Months):  (Unable to assess)  Interrupted Attempts (Past 3 Months): No " "(Unable to assess)  Total Number of Interrupted Attempts (Past 3 Months): 0 (Unable to assess)  Interrupted Attempt Description (Past 3 Months): N.A.  Aborted or Self-Interrupted Attempt (Past 3 Months): No (Unable to assess)  Total Number of Aborted or Self-Interrupted Attempts (Past 3 Months):  (Unable to assess given patient's current psychosis)  Preparatory Acts or Behavior (Past 3 Months):  (Unable to assess given patient's current psychosis)  Preparatory Acts or Behavior Description (Past 3 Months): unable to assess given patient's current psychosis    Environmental or Psychosocial Events: helplessness/hopelessness  Protective Factors: Protective Factors: strong bond to family unit, community support, or employment, able to access care without barriers, lives in a responsibly safe and stable environment, supportive ongoing medical and mental health care relationships    Does the patient have thoughts of harming others? Feels Like Hurting Others: no (Patient denies, but collateral source reported patient made statements \"that I would take the sun and blow-up everyone\")  Previous Attempt to Hurt Others: no  Is the patient engaging in sexually inappropriate behavior?: no  Does Patient have a known history of aggressive behavior: Yes  Where/who has aggression been against (people, property, self, etc): Agitation when hallucinating  When was the last episode of aggression: today  Where has the violence occurred (home, community, school): during the ride to the ED  Trigger to aggression (if known): Unknown  Has aggression occurred as a result of MH concerns/diagnosis: Patient does not have a hx of aggresion when stable on medications.  Does patient have history of aggression in hospital: Yes, throwing things at others    Is the patient engaging in sexually inappropriate behavior?  no        Mental Status Exam   Affect: Blunted  Appearance: Appropriate  Attention Span/Concentration: Attentive  Eye Contact: Engaged  " "  Fund of Knowledge: Appropriate   Language /Speech Content: Expressive Speech  Language /Speech Volume:    Language /Speech Rate/Productions:    Recent Memory: Variable (disorganized thoughts & speech)  Remote Memory: Variable (disorganized thoughts and speech)  Mood: Anxious  Orientation to Person: Yes   Orientation to Place: Yes  Orientation to Time of Day: Yes  Orientation to Date: Yes     Situation (Do they understand why they are here?): Yes  Psychomotor Behavior: Agitated  Thought Content: Hallucinations, Suicidal  Thought Form: Loose Associations, Tangential     Mini-Cog Assessment  Number of Words Recalled:    Clock-Drawing Test:     Three Item Recall:    Mini-Cog Total Score:       Medication  Psychotropic medications:   Medication Orders - Psychiatric (From admission, onward)      Start     Dose/Rate Route Frequency Ordered Stop    12/29/24 0800  buPROPion (WELLBUTRIN XL) 24 hr tablet 150 mg         150 mg Oral DAILY 12/28/24 2002 12/28/24 2048  nicotine (NICORETTE) gum 2 mg        Note to Pharmacy: DO NOT USE THIS FIELD FOR ADMIN INSTRUCTIONS; INFORMATION DOES NOT SHOW ON MAR. USE THE FIELD ABOVE MARKED ADMIN INSTRUCTIONS    2 mg Buccal EVERY 1 HOUR PRN 12/28/24 2049 12/2000  OLANZapine zydis (zyPREXA) ODT tab 10 mg        Note to Pharmacy: DO NOT USE THIS FIELD FOR ADMIN INSTRUCTIONS; INFORMATION DOES NOT SHOW ON MAR. USE THE FIELD ABOVE MARKED ADMIN INSTRUCTIONS   Placed in \"Or\" Linked Group    10 mg Oral 2 TIMES DAILY PRN 12/2000 12/2000  OLANZapine zydis (zyPREXA) ODT tab 10 mg        Note to Pharmacy: DO NOT USE THIS FIELD FOR ADMIN INSTRUCTIONS; INFORMATION DOES NOT SHOW ON MAR. USE THE FIELD ABOVE MARKED ADMIN INSTRUCTIONS   Placed in \"Or\" Linked Group    10 mg Oral DAILY PRN 12/2000 12/28/24 1958  hydrOXYzine HCl (ATARAX) tablet 50 mg         50 mg Oral EVERY 6 HOURS PRN 12/2000               Current Care Team  Patient Care Team:  No Ref-Primary, " "Physician as PCP - General    Diagnosis  Patient Active Problem List   Diagnosis Code    Bipolar affective disorder, current episode manic (H) F31.10    Psychosis (H) F29       Primary Problem This Admission  Active Hospital Problems    Bipolar affective disorder, current episode manic (H)      *Psychosis (H)        Clinical Summary and Substantiation of Recommendations   Clinical Substantiation:  Patient presents to the ED with anxiety, disorganized thoughts and speech, hallucinations, agitation (prior to coming to the ED, but not currently in the ED) and suicidal ideation. Patient s speech is tangential, non-sensical, using specific repetitive phrases. Patient has not slept for 3-4 nights. Patient endorses suicidal thoughts, stating that  I just want to suffer  and  I can only be alive when I am dead , but is unable to state intent and plan. Command hallucinations tell him to  get into a mode of rage  and he \"threatens to take the sun and blow-up everyone\". Prior to coming to the ED, the patient endorsed increased agitation including yelling and screaming, while pounding his fist repeatedly against the dashboard and the car s window. Patient appears blunt, restless and anxious. Collateral source expressed concerns for their and patient s safety. Due to worsening symptoms and increased agitation and outbursts, they called [facility] several times and was seen by the Adult  Urgent care in [facility]. Medications were changed, but no changes in the patient s symptoms. Collateral source and patient did not know if the patient has been taking prescribed medications regularly. Medical records indicate a prior hx of agitation in the hospital, including agitation and throwing things. Prior dx of Bipolar D/O. Patient receives outpatient psychiatry and is not participatient in other treatment. In consulation with Dr Cohen, the patient is medically appropriate for inpatient mental health.    Goals for crisis " stabilization:  Decrease anxiety, psychosis; increase safety, coping skills.    Next steps for Care Team:  Crisis stabilization, psych consult, coping skills    Treatment Objectives Addressed:  rapport building, orienting the patient to therapy, identifying additional supports, assessing safety    Therapeutic Interventions:  Reviewed healthy living that supports positive mental health, including looking at sleep hygiene, regular movement, nutrition, and regular socialization.    Has a specific means been identified for suicidal/homicide actions: No, patient presents with psychosis, command hallucinations to harm others & self.     If yes, describe:       Explain action steps toward mitigation:       Document completion of mitigation actions:       The follow up action still needed prior to discharge:       Patient coping skills attempted to reduce the crisis:  Patient called [facility]. Patient came from [facility] to [facility] care.    Disposition  Recommended referrals: Individual Therapy, Programmatic Care, Crisis Services, Diagnostic Assessment        Reviewed case and recommendations with attending provider. Attending Name: Dr Supriya Cohen       Attending concurs with disposition: yes       Patient and/or validated legal guardian concurs with disposition:   yes       Final disposition:  inpatient [facility] health         Imminent risk of harm: Suicidal Behavior  Severe psychiatric, behavioral or other comorbid conditions are appropriate for management at inpatient [facility] health as indicated by at least one of the following: Psychiatric Symptoms, Symptoms of impact to function, Impaired impulse control, judgement, or insight  Severe dysfunction in daily living is present as indicated by at least one of the following: Other evidence of severe dysfunction  Situation and expectations are appropriate for inpatient care: Voluntary treatment at lower level of care is not feasible  Inpatient  mental health services are necessary to meet patient needs and at least one of the following: Specific condition related to admission diagnosis is present and judged likely to deteriorate in absence of treatment at proposed level of care      Legal status: Voluntary/Patient has signed consent for treatment                                                   Reviewed court records:  (No hx of civil commitment)       Assessment Details   Total duration spent with the patient: 31 min     CPT code(s) utilized: 46144 - Psychotherapy for Crisis - 60 (30-74*) min    LUDMILA Amin, [facility], Psychotherapist  DEC - Triage & Transition Services  Callback: [phone]

## 2024-12-29 NOTE — TELEPHONE ENCOUNTER
S: [facility] , DEC  Celia   calling at 9:09 PM   about a 24 year old/Male presenting with psychosis     B: Pt arrived via Friend. Presenting problem, stressors: Pt is experiencing auditory command  hallucinations, disorganized thoughts, SI thoughts, no exact plan.     Pt affect in ED: Blunted  Pt Dx: Bipolar Disorder and Unspecified Psychosis  Previous IPMH hx? Yes: 3   Pt endorses SI, no plan   Hx of suicide attempt? No  Pt denies SIB  Pt denies HI   Pt endorses auditory hallucinations  and endorses command hallucinations.   Pt RARS Score: 3    Hx of aggression/violence, sexual offenses, legal concerns, Epic care plan? describe: History of aggression   Current concerns for aggression this visit? No  Does pt have a history of Civil Commitment? No  Is Pt their own guardian? Yes    Pt is prescribed medication. Is patient medication compliant?  Unknown   Pt denies OP services   CD concerns: None  Acute or chronic medical concerns: N/A  Does Pt present with specific needs, assistive devices, or exclusionary criteria? None      Pt is ambulatory  Pt is able to perform ADLs independently      A: Pt to be reviewed for [facility] admission. Pt is Voluntary  Preferred placement: [facility]    COVID Symptoms: No  If yes, COVID test required   Utox: Negative   CMP: Not ordered, intake requested lab  CBC: Not ordered, intake requested lab  HCG: N/A    R: Patient cleared and ready for behavioral bed placement: Yes  Pt placed on [facility] worklist? Yes    Does Patient need a Transfer Center request created? No, Pt is located within [facility] ED, [facility] ED, or [place] ED

## 2024-12-29 NOTE — ED NOTES
IP MH Referral Acuity Rating Score (RARS)    LMHP complete at referral to IP MH, with DEC; and, daily while awaiting IP MH placement. Call score to PPS.  CRITERIA SCORING   New 72 HH and Involuntary for IP MH (not adolescent) 0/1   Boarding over 24 hours 0/1   Vulnerable adult at least 55+ with multiple co morbidities; or, Patient age 11 or under 0/1   Suicide ideation without relief of precipitating factors 0/1   Current plan for suicide 0/1   Current plan for homicide 0/1   Imminent risk or actual attempt to seriously harm another without relief of factors precipitating the attempt 0/1   Severe dysfunction in daily living (ex: complete neglect for self care, extreme disruption in vegetative function, extreme deterioration in social interactions) 0/1   Recent (last 2 weeks) or current physical aggression in the ED 1/1   Restraints or seclusion episode in ED 0/1   Verbal aggression, agitation, yelling, etc., while in the ED 0/1   Active psychosis with psychomotor agitation or catatonia 1/1   Need for constant or near constant redirection (from leaving, from others, etc).  0/1   Intrusive or disruptive behaviors 1/1   TOTAL Acuity Total Score: 3

## 2024-12-29 NOTE — ED PROVIDER NOTES
Emergency Department I-PASS Sign-out      Illness Severity: Stable    Patient Summary:  24 year old male with pertinent PMH of bipolar 1 who presented with agitation, disorganization, appears to have acute decompensation and active psychosis at this time.  Mom reports that he became physically aggressive today while she was driving him to the emergency department which is very unusual.    ED Course/treatment plan: Seen by DEC , who agrees with need for admission due to decompensated psychosis.  72-hour hold has been placed.    Clinical Impression:  (F23) Acute psychosis (H)      Edited by: Supriya Cohen MD at 2024 0116    Action List:  Tests to Follow-up:  None    Medications Reconciled/Ordered:  Yes    ED Mental Health Boarding Order Set Used for Diet/PRNs/Other:  Yes    DEC, Extended Care, Psych Consult Orders:  Extended Care and Psychiatry consult ordered.    Situational Awareness & Contingency Plannin Hour Hold Status:  On 72 hour hold.  Active Orders  Legal  Emergency Hospitalization Hold (72 Hr Hold)  Frequency: Effective Now  Start Date/Time: 24 2534   Number of Occurrences: Until Specified    Disposition:  Admit/Transfer to Behavioral Health    Boarding subsequent shift/day updates:  No new updates        Tiffany Mathews MD   Emergency Medicine       Bisi, Tiffany Hennessy MD  24 [id number]

## 2024-12-29 NOTE — ED NOTES
Pt given the option for medications to allow him to decrease his anxiety. Mother approached nurse with concern as pt was contorting his foot with means to injure himself. Writer gave pt a heat pack for foot to divert his attention and pt preferred Atarax and oral Zyprexa declining injection and lamotrigine at this time.

## 2024-12-29 NOTE — CONSULTS
"  Brian Collins MRN# [id number]   Age: 24 year old YOB: 2000   Date of Admission to ED: 12/28/2024    In person visit Details:     Patient was assessed and interviewed face-to-face in person with this writer   Assessment methods included conducting a formal interview with patient, review of medical records, collaboration with medical staff, and obtaining relevant collateral information from family and community providers when available.    KRUNAL Jurado CNP            Contacts:   Attending Physician: Tiffany Mathews MD     Current Outpatient Psychiatrist: Dr Muir at [facility] for mediation management   Primary Care Provider: No Ref-Primary, Physician  Family/friends/guardian: Isi Rosenthal [phone]           History of Present Illness:   Patient presented to the ED on 12/28/2024 for psychosis and leigh ann, in the context of not sleeping for several nights and then having a steady decline in his mental state. .      Interview with patient:   Brian reports he presented to the ED after having a period of being angry, impulsive, and yelling and hitting the car (the window and the dash per mom) He reported his mom picked him up because she could tell he was struggling.  When asked about a trigger for his anger, he reported he was angry with himself.  He said something about impulses, incompetence and not doing the best in life.  Then he reported he had not slept well one night and he slept good last night and now he thinks he is ready to discharge. He had called his OP provider when he was not feeling like himself.  His OP provider told him to take quetiapine 25 mg hs, double it the next night and then increase to 75 mg next night. He received medication last night and slept good.  He is feeling better and request to discharge home. He reported he wants to \"keep following the path.\"  He was not able to explain what he meant by \"following the path.\"  He reports he has been taking his " "medication as prescribed.  His medications are bupropion, lamotrigine and quetiapine as a prn.  He denies experiencing AH/VH.  He does report he has a lot of negative self talk in his head, but not hearing voices in his ears. He ruminates.  He denies SI today.  He rates depression, anxiety and anger all 0/10 with 10 being the worst. He endorses that he was having SI yesterday. He reports his top 3 worries are 1. Laziness, 2. Incompetence, and 3 disordered.  He was unable or unwilling to elaborate on the things he listed as his top worries. He did say that he likes the word and then said it kind of like the \"chaos of the universe.\"  He has previous dx of bipolar 1 and ANTWON.  He reports he works as a  but thinks he needs to move on. He is a little frustrated with the mundane parts of his job. He likes to hang out with his roommates in his free time.  He goes to the gym 5 days a week.  He denies using alcohol or recreational drugs.       Current primary symptoms include SI, aggression, irritable, mood lability, sleep issues, psychosis, disorganization, poor frustration tolerance, impulsive, and anxiety .  Substance use is not relevant. UDS in ED was negative.    Social Hx:  Living situation: in an apartment with 2 roommates.   Highest level of education: college graduate.  Employment status: work as a  for a heating [employer] company  Financial stressors: denies  Family/Friends/Support ppl: Mom, dad, gma  Legal Issues: denies      Collateral information from the famly/friend:     Phone call to Isi Galo [phone] at 14:38 (25 minutes)    Isi/mom reports he called her early Friday morning around 7 AM to say he wasn't feeling like himself.  He was going to call in sick to work. He knew something was wrong this time.  He reached out to his parents to get help. She called local crisis and his provider who told him to increase the dose of his med.  He hung out with his parents on Friday. " "He seemed okay.  He took an additional dose of med that night and mom dropped him off at his apartment Friday evening.  She picked him back up on Saturday and he asked her if she wanted to read his journal. She asked him if he would let her help him if she was concerned.  He said he would.  She suggested they just go to the emergency department and he agreed.  She reports on the way to the ED he was hitting the window of the car and hitting the dash. She reports he was also saying things like \"the only way to live is to die.\" AND \"I am going to harness the power of the Sun and blow up the world.\"  He became agitated when mom could not find the entrance to the ED.  Yesterday, Saturday, he was saying he is the smartest person in the world and he is going to manipulate everyone to get what he wants while in the ED. He told her he has been talking to Daniel.  He has been discussing in depth theological topics, which is not like him.  He made a joke to his mom and said he is going to get his balls back from the nurse and leave today.. Mom reports he worries about how she/mom responds when he is not doing well.     He has called his mom since he has been in the ED. She has not found his wallet.  He lost it before he presented to the ED.  She plans to go to his apartment to look for it but she is sick today.  She does have his phone. Mom reports he got a text on his phone from [facility] about an appointment on Tuesday, December 31st at 11:40 with Isadora Amaya  [phone]    His mom and dad are trying to figure out a plan for when he comes home.  Mom does not think he can be alone.  His dad lives in [place]. She does not think his roommates should have to be responsible for him and she has 2 step sons that should not be around him and his dad has a baby at his home. She has talked to him and he seems better today compared to yesterday but she does not think he is ready to return home.       Phone call to Isi Rosenthal " "[phone] at 16:02 (5 minutes)  Called his mom back to give her the name of the person to text about being off work tomorrow and what Brian and dictated to be said in the text.            Collateral information from chart review:     Reviewed DEC assessment and ED notes.     Per DEC assessment completed on 12/28/2024:   The patient was referred by [facility] due to concerns including hallucinations, disorganized thoughts, speech, agitation and suicidal ideation. Patient s speech is tangential, using specific repetitive phrases including  there is chaos in my mind and I m here to bring order to the chaos . Patient had not been able to sleep for 3-4 days. Patient endorses suicidal thoughts, stating that  I just want to suffer , but he struggles to put thoughts into words a plan or to elaborate on his statements. He kept saying  I just need to untangle the chaos in my mind . Patient endorses command hallucinations, telling him to  get into a mode of rage .  During the ride to the ED, the patient was agitated and repeatedly yelled and screamed while pounding with his fist repeatedly against the dashboard and the car s window. He states that he was responding to the voices in his head which made his mother afraid. Patient appears blunt, restless and anxious. Collateral source reports the patient was not feeling well on Friday night and seen by urgent care for adult mental health in [facility]. The attending ordered to increase Seroquel (prn) by 25 mg. The symptoms worsened, including more ongoing agitation, unable to sleep and being concerned about  chaos , being disruptive and restless. He made what appeared homicidal statements such as \"I want to take the sun and blow up everyolne\". They called [facility] who referred him to the ED. The patient denies delusions and homicidal ideation. The patient is seen by Dr Muir at [facility] for mediation management. It is unclear if the patient is taking " "medications regularly. Hx of substance use but not currently.             Psychiatric History:     As documented in HPI, Impression, collateral information from chart review & family/friend/guardian, DEC assessment and as listed below (not exhaustive).    Past Psychiatric Diagnosis:   Per Brief chart review: Bipolar 1, ANTWON    Past Medication Trials:   Per brief chart review:  Lithium - Per EHR: weight gain  Xanax  Zyprexa - refused to take so it was stopped. Per EHR: (muscle stifness, \"memory loss\")   quetiapine    Other Psychiatric History:   Per H&P by Dr Amador Mckinney MD 5/13/2021 at [facility]:  Past Diagnoses: BPAD1  Age of onset: 18  Previous  admissions: 2 previous admissions, both for leigh ann. First episode of leigh ann was spring 2018 at [facility], at which time patient had decreased need for sleep (2-3 hrs per night), increased energy and activities, risky behaviors (threw knives at a wall), thinking he was connected to spirits, thought he was possessed by the devil, and ideas of reference (special or double meanings from everyday conversation). He was stabilized on Li and Zyprexa, which he stopped taking immediately after discharge. He was then hospitalized again for leigh ann March 2021 at [facility] with similar symptoms. Was stabilized on Zyprexa which he discontinued after hospitalization.     Trauma/Abuse history: Denied.             Past Medical and Surgical History:     PAST MEDICAL HISTORY:   Past Medical History:   Diagnosis Date    Bipolar 1 disorder        PAST SURGICAL HISTORY:   Past Surgical History:   Procedure Laterality Date    Hand/finger surgery Right              Substance Use History:   As documented in HPI, Impression, collateral information from chart review & family/friend/guardian, DEC assessment and as listed below (not exhaustive).    Substance Use:     Per Discharge Summary by Dr Amador Mckinney MD 5/13/2021:    EtOH and MJ occasionally (1-2x per mo)           Family History:   As " documented in HPI, Impression, collateral information from chart review & family/friend/guardian, DEC assessment and as listed below (not exhaustive).    Family psychiatric/mental health history includes:     Per H&P by Dr Amador Mckinney MD 5/13/2021 at [facility]:  Family History   Psychiatric: Alcohol use problems in two great-grandfathers. No hx of BPAD or psychosis.  Suicide: denied           Allergies and Medications:   No Known Allergies    Medications:     Brian was agreeable to have Seroquel 200 mg scheduled.  He took it last night and he was feeling better in the AM.     Current Facility-Administered Medications   Medication Dose Route Frequency Provider Last Rate Last Admin    acetaminophen (TYLENOL) tablet 650 mg  650 mg Oral Q4H PRN Supriya Cohen MD        buPROPion (WELLBUTRIN XL) 24 hr tablet 150 mg  150 mg Oral Daily Supriya Cohen MD   150 mg at 12/29/24 0846    hydrOXYzine HCl (ATARAX) tablet 50 mg  50 mg Oral Q6H PRN Supriya Cohen MD   50 mg at 12/29/24 0612    ibuprofen (ADVIL/MOTRIN) tablet 600 mg  600 mg Oral Q6H PRN Supriya Cohen MD        lamoTRIgine (LaMICtal) tablet 50 mg  50 mg Oral At Bedtime Supriya Cohen MD   50 mg at 12/28/24 2211    melatonin tablet 3 mg  3 mg Oral At Bedtime PRN Supriya Cohen MD   3 mg at 12/28/24 2211    nicotine (NICORETTE) gum 2 mg  2 mg Buccal Q1H PRN Supriya Cohen MD   2 mg at 12/29/24 0409    OLANZapine zydis (zyPREXA) ODT tab 10 mg  10 mg Oral BID PRN Supriya Cohen MD   10 mg at 12/28/24 2006    Or    OLANZapine zydis (zyPREXA) ODT tab 10 mg  10 mg Oral Daily PRN Supriya Cohen MD   10 mg at 12/28/24 2217     Current Outpatient Medications   Medication Sig Dispense Refill    buPROPion (WELLBUTRIN XL) 150 MG 24 hr tablet Take 1 tablet by mouth every morning.      lamoTRIgine (LAMICTAL) 100 MG tablet Take 0.5 tablets by mouth at bedtime.      QUEtiapine (SEROQUEL) 25 MG tablet Take 12.5 mg by mouth  "daily.             Mental Status Exam:   Appearance:  awake, alert, adequately groomed, and casually dressed  Attitude:  cooperative  Eye Contact:  fair  Mood:   \"good\"   Affect:  irritable and expansive  Speech:  clear, coherent  Psychomotor Behavior:  fidgeting and tremor observed   Thought Process:   linear, a little disorganzied and off but better than what was reported yesterday.   Associations:  no loose associations  Thought Content:  no evidence of suicidal ideation or homicidal ideation and denies AH/VH, grandiose,   Insight:  fair  Judgment:  fair  Oriented to:  time, person, and place  Attention Span and Concentration:  fair  Recent and Remote Memory:  fair  Fund of Knowledge: appropriate  Muscle Strength and Tone: normal  Gait and Station: Normal         Physical Exam:     BP (!) 151/87   Pulse 96   Temp 98.4  F (36.9  C) (Oral)   Resp 18   SpO2 98%   Weight is 0 lbs 0 oz Data Unavailable There is no height or weight on file to calculate BMI.      Laboratory/Imaging:    Recent Results (from the past 72 hours)   Urine Drug Screen Panel    Collection Time: 12/28/24  6:00 PM   Result Value Ref Range    Amphetamines Urine Screen Negative Screen Negative    Barbituates Urine Screen Negative Screen Negative    Benzodiazepine Urine Screen Negative Screen Negative    Cannabinoids Urine Screen Negative Screen Negative    Cocaine Urine Screen Negative Screen Negative    Fentanyl Qual Urine Screen Negative Screen Negative    Opiates Urine Screen Negative Screen Negative    PCP Urine Screen Negative Screen Negative       ROS: 10 point ROS neg other than the symptoms noted above in the HPI.     I have reviewed the physical done by the ED provider on 12/28/2024. Notable changes include: none            Impression:   This patient is a 24 year old year old  male with a past psychiatric history of  Bipolar 1 and ANTWON, who presented to the ED on 12/28/2024  for psychosis and leigh ann.  Prior to presenting to the " ED Brian was developing leigh ann symptoms (see HPI and phone call to mom).  The last time he had an episode was in 2021.  He was diagnosed with bipolar in 2019 according to his mom.     Significant symptoms are noted in the HPI. There is genetic loading for none known.  Medical history does not appear to be significant.  Substance use does not appear to be playing a contributing role in the patient's presentation.  Major stressors are chronic mental health issues.  Patient appears to cope with stress/frustration/emotion by  hitting things (not people) .  Patient's support system includes family and outpatient team. Protective factors: family and engaged in treatment. Risk factors: SI, maladaptive coping, family dynamics, impulsive, and past behaviors. Patient Strengths: help seeking, engaging with ED treatment team, and willing to take medication.    Based on interview with patient and patient's guardian/parent, record review, conversations ED staff, [facility] staff and ED attending, the patient meets criteria for Bipolar 1 disorder with manic symptoms.  Current medications are listed above.   Recommended medication adjustments are listed below.  Acute inpatient stabilization is needed as indicated by manic behaviors .  Other recommendations are listed below.    Risk for harm is elevated.    Brief Therapeutic Intervention(s):   Provided rappport building, active listening, unconditional positive regard, and validation.    Legal Status: 72-h Hold signed on 12/28/2024 at 22:14           Diagnoses:   Principal Diagnosis: Bipolar 1 disorder, mixed episode.    Secondary psychiatric diagnoses of concern this admission:  ANTWON by hx    Current medical diagnosis being treated:   none         Recommendations:     Discussed the case and writer's recommendations with Dr Nico Quan MD, ED provider    Recommend acute inpatient stabilization based on manic behaviors.   2.   Recommend starting Seroquel 200 mg hs for mood stabilization.    3.   Continue:    Bupropion  mg daily   Lamotrigine 50 mg hs   .   Continue to consult psychiatry as needed.    Please call [facility]/DEC at [phone] if you have follow-up questions or wish to place another consult.         Attestation       Attestation:  Patient time: 40 minutes (meeting on 2 different occassions.   Reviewed labs, EKG, and relevant imagin minutes  Family/guardian/other time: 30 minutes (spoke to mom twice)  Team time:30 minutes  Chart review: 40 minutes  Documentation: 45  minutes  Total time: 185 minutes spent on the date of the encounter.    I have provided critical care services at the bedside in the [facility] adult emergency department, evaluating the patient, reviewing notes and laboratory values and directing care. I have discussed recommendation regarding whether or not hospitalization is needed and recommendations for medications and laboratory testing with the attending emergency department provider. Marci Krueger, DNP, APRN, CNP 2024.    Disclaimer: This note consists of symbols derived from keyboarding, dictation, and/or voice recognition software. As a result, there may be errors in the script that have gone undetected.  Please consider this when interpreting information found in the chart.

## 2024-12-29 NOTE — TELEPHONE ENCOUNTER
01:26 - Per HI JHOANA Holley, pt would need to start in a MHICU bed d/t acuity/aggressive behaviors PTA. No MHICU bed available currently. Could possibly review again after pt has had more time in the ED to observe behaviors    R:  [facility] Access Inpatient Bed Call Log 12/28/24 @ 11:26PM  Intake has called facilities that have not updated their bed status within the last 12 hours.    [facility]    [facility]: No appropriate bed available   [facility]: @ cap per website  [facility]: @ cap per website  [facility]: @ cap per website. Per Ayla @ 11:40PM, they are at capacity   [facility]: @ cap per website   [facility]: @ cap per website  [facility]: Posting 4 beds (Young Adult unit ages 18-35: No recent aggressions, violence or sexual assault. Neg covid required). Per Mer @ 11:41PM, 2 YA beds. Called again @ 12:28AM and they are unable to review high acuity referrals tonight (aggression, psychosis, leigh ann).  [facility]: @ cap per website   [facility]: @ cap per website  [facility]: Posting 5 beds. Mixed unit/Low acuity only. Pt not appropriate d/t hx of aggression   [facility]: @ cap per website. Low acuity. No aggression.   [facility]: @ cap per website   [facility]: @ cap per website. Low acuity only. No aggression.   [facility]: Does not review after 10PM.     [facility]: @ cap for all 3 [facility]: Posting 6 beds (No hx of aggression. No sexual offenders. Voluntary patients only). Meets exclusionary d/t 72 HH  [facility]: Posting 4 beds (Always low acuity only. Must have the cognitive ability to do programming. No aggressive or violent behavior or recent HX in the last 2 yrs. MH must be primary). Pt not appropriate d/t hx of aggression   [facility]: @ cap per website; Low acuity only, Violence/aggression capped.   [facility]: Posting 2 beds (Low acuity, Neg Covid). Per Jasmine @ 11:42PM, they are  full  [facility]: Posting 14 beds. Per Marivel @ 11:53PM, SD/low acuity beds only  [facility]: Posting 5 beds (No hx of aggression/assault. No lines, drains or tubes. Does not provide detox or CD treatment. Must have confirmed ride home upon discharge). Per previous calls made by this writer, they do not take referrals overnight. Call back after 8AM  [facility]: Posting 32 beds. Facility is in [place]. Per Intake policy, patients must be voluntary for placement in [place]. Pt is on a 72 HH  [facility]: Posting 5 beds (Mixed unit/Low acuity). Per Nataliia @ 11:59PM, general beds and 1 high acuity bed but notes that they have acute milieu with an adolescent, so cannot take very high acuity pts (no aggression or acute psychosis).      Pt remains on work list until appropriate placement is available

## 2024-12-29 NOTE — PLAN OF CARE
"      Brian Collins  December 28, 2024  Plan of Care Hand-off Note     Patient Recommended Care Path:  Inpatient mental health    Clinical Substantiation:  Patient presents to the ED with anxiety, disorganized thoughts and speech, hallucinations, agitation (prior to coming to the ED, but not currently in the ED) and suicidal ideation. Patient s speech is tangential, non-sensical, using specific repetitive phrases. Patient has not slept for 3-4 nights. Patient endorses suicidal thoughts, stating that  I just want to suffer  and  I can only be alive when I am dead , but is unable to state intent and plan. Command hallucinations tell him to  get into a mode of rage  and he \"threatens to take the sun and blow-up everyone\". Prior to coming to the ED, the patient endorsed increased agitation including yelling and screaming, while pounding his fist repeatedly against the dashboard and the car s window. Patient appears blunt, restless and anxious.   Collateral source expressed concerns for their and patient s safety. Due to worsening symptoms and increased agitation and outbursts, they called [facility] several times and was seen by the Adult  Urgent care in [facility]. Medications were changed, but no changes in the patient s symptoms. Collateral source and patient did not know if the patient has been taking prescribed medications regularly. Medical records indicate a prior hx of agitation in the hospital, including agitation and throwing things. Prior dx of Bipolar D/O. Patient receives outpatient psychiatry and is not participatient in other treatment. In consulation with Dr Cohen, the patient is medically appropriate for inpatient mental health.    Goals for crisis stabilization:  Decrease anxiety, psychosis; increase safety, coping skills.    Next steps for Care Team:  Crisis stabilization, psych consult, coping skills    Treatment Objectives Addressed:  rapport building, orienting the patient to therapy, " identifying additional supports, assessing safety    Therapeutic Interventions:  Reviewed healthy living that supports positive mental health, including looking at sleep hygiene, regular movement, nutrition, and regular socialization.    Has a specific means been identified for suicidal.homicide actions: No  If yes, describe:    Explain action steps toward mitigation:    Document completion of mitigation action:    The follow up action still needed prior to discharge:      Patient coping skills attempted to reduce the crisis:  Patient called [facility]. Patient came from [facility] to [facility] Urgent care.       Imminent risk of harm: Suicidal Behavior  Severe psychiatric, behavioral or other comorbid conditions are appropriate for management at inpatient mental health as indicated by at least one of the following: Psychiatric Symptoms, Symptoms of impact to function, Impaired impulse control, judgement, or insight  Severe dysfunction in daily living is present as indicated by at least one of the following: Other evidence of severe dysfunction  Situation and expectations are appropriate for inpatient care: Voluntary treatment at lower level of care is not feasible  Inpatient mental health services are necessary to meet patient needs and at least one of the following: Specific condition related to admission diagnosis is present and judged likely to deteriorate in absence of treatment at proposed level of care      Collateral contact information:  Patient's mother, Massimo Rosenthal @ [phone]    Legal Status: Voluntary/Patient has signed consent for treatment                                                    Reviewed court records:  (No hx of civil commitment)     Psychiatry Consult: Patient has Psychiatry Consult Order    LUDMILA Amin, [facility], Psychotherapist

## 2024-12-29 NOTE — ED NOTES
"Patient is pacing the halls, cooperative, but continues to state that he will be discharging today regardless of the 72 hour hold he is on. Patient states that he feels like he is \"stuck in a loop\", and states that today is the first day he feels like a \"real human\" and wants to leave the hospital so he can \"go live his life\" now that he feels like a real human again. Patient is verbally abusive towards some other staff, but has been calm and cooperative during interactions with this writer. Writer explained to patient that he's on a hold and is on the list to be admitted to inpatient mental health. Writer asked if pt would engage with DEC  to discuss his concerns and thoughts. Patient agreeable. DEC  notified.  "

## 2024-12-29 NOTE — TELEPHONE ENCOUNTER
[place]: [place]  Access Inpatient Bed Call Log 12/29/24  @  7:05 am:    Intake has called facilities that have not updated the bed status within the last 12 hours.             Pt not appropriate for beds available,   [facility] is posting 0 beds.               [facility] is posting 0 beds. [phone]; per call at 7:05 am to Jimy, they are at cap.    [facility] is posting 0 beds. Negative Covid required. [phone]   [facility] is posting 0 beds. Neg covid. No high school/Sue-psych. [phone]; per call at 7:06 am to Kavitha, they are at cap.    [facility] is posting 0 beds. [phone]:    [facility] is posting 0 beds. [phone];     [facility] is posting 3 young adult beds. Ages 18-35; Negative covid. [phone]; per call at 11:08 am to Nicky, they are reviewing for their last bed and said we can call again after 3 pm to check status again.   [facility] is posting 0 beds.    [facility] is posting 0 beds; [phone].                     [facility] is posting 5 beds. LOW acuity. Ages 12+. Neg covid- [phone];   [facility] has 0 beds posted. No aggression. Negative Covid. Low acuity.   [facility] ([place] is posting 0 beds. Low acuity only. Neg covid.  [phone];    [facility] is posting 0 beds. Low acuity. No current aggression.    [facility] is posting 0 beds. Negative covid. [phone]  ext  [id number]; per call at 7:11 am, recording said that their adult and adol units are at cap.    [facility] ([place]) is posting 0 beds. Negative covid.  [phone];   [facility] is posting 0 beds. Low acuity. 72 HH hold preferred. Sue unit. Negative covid required. [phone]; per call at 7:14 am to Leydi, they are at cap.    [facility] ([place] is posting 0 beds. Low acuity only. Neg covid.  [phone]; per call at 7:16 am to  Gael, they are at [facility].     [facility] [place] is posting 6 beds.  Negative covid required.   Vol only, No history of aggression, violence, or assault. No sexual offenders. No 72 HH holds. [phone];            [facility] is posting 3 beds. Negative covid required.  (Must have the cognitive ability to do programming. No aggressive or violent behavior or recent HX in the last 2 yrs. MH must be primary.) Always low acuity.  [phone];   [facility] has 0 beds posted. Negative covid required.  Low acuity only. Violence and aggression capped.  [phone]; per call at 7:18 am to Bonnie, she will ask her charge nurse to call us back re: bed avail info.    [facility] is posting 2 beds. Low acuity, Negative covid required. [phone]; per call at 7:21 am to Liss, they have 2 beds avail but have 3 pts in their ED who may need the beds. She said we can call again to check later.    [facility] is [facility] 11 beds. Negative covid required.  [phone]; per Jasmine's call to SELVIN Hurt at 7:10 am, they have 5 step-down/low acuity beds avail.  Per notes earlier today, 12/29/24, pt was declined due to needing a MHICU bed.   [facility]. Negative covid. LOW acuity. (No lines, drains, or tubes, oxygen, CPAP, IV, etc.) Must Have a Ride Home. [phone]; per call at 7:27 am to Ronnie, they have 5 beds avail    [facility] is posting 5 beds. Negative covid. (No. lines, drains, or tubes, oxygen, CPAP, IV, etc.) [phone]; per call at 7:29 am to Kelsie, they have beds avail (specific number unknown) including 1 high acuity bed. Per call at 9:18 am to Lisa, she will ask Kelsie RN to call intake back.  [facility] is posting 18 beds. No covid test required. [phone]  [place] State; per call at 7:31 am to Celia, they have 11 adult beds and 7 child/adol beds avail.  Facility is in [place]. Pt is on a hold. Per intake policy, pts on holds can not be  presented for review to [facility] facilities.           Pt remains on the work list pending appropriate bed availability.

## 2024-12-30 ENCOUNTER — TELEPHONE (OUTPATIENT)
Dept: BEHAVIORAL HEALTH | Facility: CLINIC | Age: 24
End: 2024-12-30
Payer: COMMERCIAL

## 2024-12-30 PROCEDURE — 250N000013 HC RX MED GY IP 250 OP 250 PS 637: Performed by: NURSE PRACTITIONER

## 2024-12-30 PROCEDURE — 99285 EMERGENCY DEPT VISIT HI MDM: CPT | Performed by: NURSE PRACTITIONER

## 2024-12-30 PROCEDURE — 250N000013 HC RX MED GY IP 250 OP 250 PS 637: Performed by: STUDENT IN AN ORGANIZED HEALTH CARE EDUCATION/TRAINING PROGRAM

## 2024-12-30 PROCEDURE — 250N000013 HC RX MED GY IP 250 OP 250 PS 637: Performed by: EMERGENCY MEDICINE

## 2024-12-30 RX ORDER — OLANZAPINE 10 MG/1
10 TABLET, ORALLY DISINTEGRATING ORAL ONCE
Status: COMPLETED | OUTPATIENT
Start: 2024-12-30 | End: 2024-12-30

## 2024-12-30 RX ORDER — QUETIAPINE FUMARATE 100 MG/1
300 TABLET, FILM COATED ORAL AT BEDTIME
Status: DISCONTINUED | OUTPATIENT
Start: 2024-12-30 | End: 2024-12-31

## 2024-12-30 RX ADMIN — QUETIAPINE FUMARATE 300 MG: 100 TABLET ORAL at 21:40

## 2024-12-30 RX ADMIN — HYDROXYZINE HYDROCHLORIDE 50 MG: 50 TABLET ORAL at 19:57

## 2024-12-30 RX ADMIN — NICOTINE POLACRILEX 2 MG: 2 GUM, CHEWING ORAL at 18:21

## 2024-12-30 RX ADMIN — OLANZAPINE 10 MG: 10 TABLET, ORALLY DISINTEGRATING ORAL at 00:03

## 2024-12-30 RX ADMIN — NICOTINE POLACRILEX 2 MG: 2 GUM, CHEWING ORAL at 01:28

## 2024-12-30 RX ADMIN — NICOTINE POLACRILEX 2 MG: 2 GUM, CHEWING ORAL at 14:45

## 2024-12-30 RX ADMIN — NICOTINE POLACRILEX 2 MG: 2 GUM, CHEWING ORAL at 20:02

## 2024-12-30 RX ADMIN — LAMOTRIGINE 50 MG: 25 TABLET ORAL at 21:41

## 2024-12-30 RX ADMIN — NICOTINE POLACRILEX 2 MG: 2 GUM, CHEWING ORAL at 22:51

## 2024-12-30 RX ADMIN — OLANZAPINE 10 MG: 10 TABLET, ORALLY DISINTEGRATING ORAL at 23:21

## 2024-12-30 RX ADMIN — NICOTINE POLACRILEX 2 MG: 2 GUM, CHEWING ORAL at 13:10

## 2024-12-30 RX ADMIN — NICOTINE POLACRILEX 2 MG: 2 GUM, CHEWING ORAL at 16:02

## 2024-12-30 RX ADMIN — HYDROXYZINE HYDROCHLORIDE 50 MG: 50 TABLET ORAL at 04:51

## 2024-12-30 RX ADMIN — BUPROPION HYDROCHLORIDE 150 MG: 150 TABLET, EXTENDED RELEASE ORAL at 07:49

## 2024-12-30 RX ADMIN — OLANZAPINE 10 MG: 10 TABLET, ORALLY DISINTEGRATING ORAL at 19:57

## 2024-12-30 ASSESSMENT — COLUMBIA-SUICIDE SEVERITY RATING SCALE - C-SSRS
1. SINCE LAST CONTACT, HAVE YOU WISHED YOU WERE DEAD OR WISHED YOU COULD GO TO SLEEP AND NOT WAKE UP?: NO
SUICIDE, SINCE LAST CONTACT: NO
TOTAL  NUMBER OF INTERRUPTED ATTEMPTS SINCE LAST CONTACT: NO
6. HAVE YOU EVER DONE ANYTHING, STARTED TO DO ANYTHING, OR PREPARED TO DO ANYTHING TO END YOUR LIFE?: NO
2. HAVE YOU ACTUALLY HAD ANY THOUGHTS OF KILLING YOURSELF?: NO
ATTEMPT SINCE LAST CONTACT: NO
TOTAL  NUMBER OF ABORTED OR SELF INTERRUPTED ATTEMPTS SINCE LAST CONTACT: NO

## 2024-12-30 ASSESSMENT — ACTIVITIES OF DAILY LIVING (ADL)
ADLS_ACUITY_SCORE: 41

## 2024-12-30 NOTE — ED NOTES
"Pt is beginning to escalate, he is pacing and stating that he is leaving at 12 no matter what and he will \"fuck this place up\" if we try to prevent him. Pt was able to be redirected back into his room where he continued to ramble about the government, evils of the world and how he was going to use his \"manipulation techniques\" to elope. Terry team was contacted, pt agreed to take oral Zyprexa. When Terry team arrived pt was resting in his bed with his eyes closed.   "

## 2024-12-30 NOTE — CONSULTS
"Adult Psychiatry Consultation     Brian Collins MRN# [id number]   Age: 24 year old YOB: 2000   Date of Admission to ED: 12/28/2024    In person visit Details:     Patient was assessed and interviewed face-to-face in person with this writer   Assessment methods included conducting a formal interview with patient, review of medical records, collaboration with medical staff, and obtaining relevant collateral information from family and community providers when available.      KRUNAL Jurado CNP            Contacts:   Attending Physician: Nico Quan*     Current Outpatient Psychiatrist: Dr Muir at [facility] for mediation management      Primary Care Provider: No Ref-Primary, Physician  Family/friends/guardian: Isisanthosh Rosenthal [phone]            CC:     Per EHR: Patient presented to the ED on 12/28/2024 for psychosis and leigh ann, in the context of not sleeping for several nights and then having a steady decline in his mental state. .         Subjective:     Brian was agreeable to meet.  He reported his mood is flat and content today.  He rated his depression 3/10, anxiety 7/10 and anger 2/10 with 10 being the worst. Today he reports he is worried about 1. Making mistakes in life, 2. Having a heart attack - he endorses a family hx of heart problems, and 3. Hurting other peoples feelings.  He reports that he was worried about his thoughts hurting other people before coming to the hospital.  He reports that is the reason he needed to come to the hospital. He said something about not wanting an exorcism but would not explain it further when writer did not understand.  He denies SI/AH/VH/HI. He reported he slept off and on last night for a total of about 3 hours.  He denies having negative self talk going on in his head and denies ruminating.  He reported today is \"a healing kind of day.\"  He reported he did have a period of feeling sad yesterday while talking to one of the staff about " sad stories.  He reports he is feeling better and he reports he IS going to discharge today. Discussed concern that he is not sleeping more than 3 hours at night and that it would be better for him to stay longer.  He was very convincing that he would sleep better at home. He talked about his 2 year plan: keep working, volunteer (did not know where), go to the gym less (reports he really does not like lifting weights), and be more social.  Discussed writer's need to review the chart and told to his mom before any decisions could be made.  He had not talked to his mom yet today but agreed to call her.  Writer will talk to his mom after he talks to her.     Writer returned to meet with Brian after thoroughly reviewing the nurses notes. He was informed he would be staying longer to achieve better stabilization.  Writer explained the nurses notes indicated he has not been stable.  He replied he was agreeable to writers recommendations.  Writer explained writer recommends his quetiapine dose be increased to 300 mg hs.  He was agreeable.  He understands he will be reassessed tomorrow for further recommendations. He asked to be able to call his boss and tell him explain his indefinite absence.  He wanted to know if he could be at work by Friday.  Writer declined to give him a time frame for how long he would need to be off. Writer did call his mom to get his bosses number for him.              Objective:     Brian appeared bright and cheerful today.  He has enjoyed talking to the 1:1 staff.  He is eager to return home.  His hygiene is adequate and he is eating well.  However, he still has some illogical thinking.  He is not sleeping well. His mood is labile per nursing notes.  He continues to have a focus on philosophical topics, which his mom has reported is not his norm.     Discussed with Dr Marie and Martine [facility] - patient is currently agreeable to inpatient admission and to taking medication.  Will discontinue the 72HH  "and explain to Brian since he is agreeable to admission and to taking medication the 72HH will be stopped but he is still holdable if he tries to elope or if he tries to leave before he is stable. Martine and writer discussed this with the patient and he verbalized an understanding.    Per staff notes:     Per Mel Muniz RN ED note 12/30/2024 at 02:17:  Pt up walking in hallway. Pt advised that other pts are sleeping and we needed to talk quietly. Pt states \"time is not real\" and he has to go to work his 9-5 job/ Pt is becoming agitated that he is leaving or he is going to \"fuck things up\". Pt is redirected to his room and Terry team contacted. Pt is upset and becoming belligerent but had been redirectable.     Per Mel Muniz RN ED note 12/30/2024 at 00:04:  Pt is beginning to escalate, he is pacing and stating that he is leaving at 12 no matter what and he will \"fuck this place up\" if we try to prevent him. Pt was able to be redirected back into his room where he continued to ramble about the government, evils of the world and how he was going to use his \"manipulation techniques\" to elope. Terry team was contacted, pt agreed to take oral Zyprexa. When Terry team arrived pt was resting in his bed with his eyes closed.      Per Mel Muniz RN ED note 12/29/2024 at 23:53:   pt up in hallway, needs frequent reminders to return to his room and not interact with pts sleeping in the hallway. Pt returns to room with no issues. Snack of mac and cheese given    Per Mel Muniz RN ED note 12/29/2024 20:00:  this writer arrived to the pt room and security had pt on the ground. Per security pt was attempting to leave through the ambulance bay and when redirected back to his room pt made verbal threats, was posturing at security and punching the window in the room. A code 21 was called, MD notified and pt was given Ativan, Geodon and Benadryl. Pt was moved to seclusion room but agreed to contract for " safety and door was left open. Pt is apologetic and remorseful for his actions and is currently cooperative with cares. Pt denies any pain from the interaction.. ..       Psychotropic PRNs:     12/30/2024 00:03 Zypexa ODT 10 mg     12/29/2024 19:55 ATivan 2 mg IM, Geodon 20 mg IM, Benadryl 50 mg IM  12/29/2024 17:50 Zyprexa ODT 10 mg     12/28/2024 22:17  Zyprexa ODT 10 mg   12/28/2024 20:06 Zyprexa ODT 10 mg       Seclusion and Restraints:   12/29/2024 19:43 physical hold, escorted to seclusion and secluded after an elopement attempt.     Medications:    Current Facility-Administered Medications   Medication Dose Route Frequency Provider Last Rate Last Admin    acetaminophen (TYLENOL) tablet 650 mg  650 mg Oral Q4H PRN Supriya Cohen MD        buPROPion (WELLBUTRIN XL) 24 hr tablet 150 mg  150 mg Oral Daily Supriya Cohen MD   150 mg at 12/30/24 0749    hydrOXYzine HCl (ATARAX) tablet 50 mg  50 mg Oral Q6H PRN Supriya Cohen MD   50 mg at 12/30/24 0451    ibuprofen (ADVIL/MOTRIN) tablet 600 mg  600 mg Oral Q6H PRN Supriya Cohen MD        lamoTRIgine (LaMICtal) tablet 50 mg  50 mg Oral At Bedtime Supriya Cohen MD   50 mg at 12/29/24 2236    melatonin tablet 3 mg  3 mg Oral At Bedtime PRN Supriya Cohen MD   3 mg at 12/28/24 2211    nicotine (NICORETTE) gum 2 mg  2 mg Buccal Q1H PRN Supriya Cohen MD   2 mg at 12/30/24 0128    OLANZapine zydis (zyPREXA) ODT tab 10 mg  10 mg Oral BID PRN Supriya Cohen MD   10 mg at 12/30/24 0003    Or    OLANZapine zydis (zyPREXA) ODT tab 10 mg  10 mg Oral Daily PRN Supriya Cohen MD   10 mg at 12/28/24 2217    QUEtiapine (SEROquel) tablet 200 mg  200 mg Oral At Bedtime Nico Quan MD   200 mg at 12/29/24 2236     Current Outpatient Medications   Medication Sig Dispense Refill    buPROPion (WELLBUTRIN XL) 150 MG 24 hr tablet Take 1 tablet by mouth every morning.      lamoTRIgine (LAMICTAL) 100 MG tablet  "Take 0.5 tablets by mouth at bedtime.      QUEtiapine (SEROQUEL) 25 MG tablet Take 12.5 mg by mouth daily.                Collateral information from chart review and phone calls:     Reviewed DEC assessment, Initial ED consult, and ED notes.    Collateral information from the famly/friend:     Phone call to Isi Rosenthal [phone] at 13:47 (15 minutes)     Spoke to Isi.  She reported she talked to him earlier.  She was concerned he was manipulating her by saying he doesn't want to miss too much work. He knows she doesn't want him to miss too much work. Other than his comment about work she reported he does seem better.  Mom has talked to his dad and his dad also thought he sounded better.     Roommate: Eleno reached out to mom.  Eleno had a text exchange had reached out to Brian before he was in the hospital.  Eleno was checking on Brian because of his anger and that he was struggling.  Brian replied he needed help with the cooling system for this weapon (weapon interpreted at Brian).     Mom read the text to this writer:   \"I am going to need a cooling system for this weapon, lol. Are you willing to help\" (Text to his friend Eleno).  Million called Brian's mom to let her know what Brian had said.  Brian had given his mom his phone passcode to phone because he had lost his wallet and wanted her to freeze his credit cards.  She did call yesterday to have all he credit cards froze.     Brian had called her and asked if she would be able to pick him up at 5 PM.  His mom replied that if that was what the recommendation she would find out a way to get him. Writer reviewed nurses notes while talking to his mom.  He had been agitated during the night, the [facility] team had been called and received a prn of Zyprexa.  Writer explained to his mom that he will need to stay longer.  She was glad he was going to stay until he is stable.     Phone call to Isi Rosenthal [phone]  at 15:14 (5 minutes)     Called " "patient's mom to request his bosses phone number so he could call his boss to request PTO.  His mom provided the number for his boss and reported she did not find his journal but she did find his wallet.  She asked this writer to let him know his wallet was found.       Mental Status Exam:   Appearance:  awake, alert, adequately groomed, and dressed in hospital scrubs  Attitude:  somewhat cooperative,   Eye Contact:  good  Mood:   \"flat, content\"  Affect:  calm and anxious  Speech:  clear, coherent  Psychomotor Behavior:  no evidence of tardive dyskinesia, dystonia, or tics and intact station, gait and muscle tone  Thought Process:  linear, a little illogical  Associations:  no loose associations  Thought Content:  no evidence of suicidal ideation or homicidal ideation and denied AH/VH, some philosophical fixation,   Insight:  limited  Judgment:  fair  Oriented to:  time, person, and place  Attention Span and Concentration:  intact  Recent and Remote Memory:  fair  Fund of Knowledge: appropriate  Muscle Strength and Tone: normal  Gait and Station: Normal         Physical Exam:     /69   Pulse 117   Temp 98.4  F (36.9  C) (Oral)   Resp 16   Ht 1.829 m (6')   Wt 108.3 kg (238 lb 11.2 oz)   SpO2 97%   BMI 32.37 kg/m    Weight is 238 lbs 11.2 oz 6' 0\" Body mass index is 32.37 kg/m .    Laboratory/Imaging:    Recent Results (from the past 72 hours)   Urine Drug Screen Panel    Collection Time: 12/28/24  6:00 PM   Result Value Ref Range    Amphetamines Urine Screen Negative Screen Negative    Barbituates Urine Screen Negative Screen Negative    Benzodiazepine Urine Screen Negative Screen Negative    Cannabinoids Urine Screen Negative Screen Negative    Cocaine Urine Screen Negative Screen Negative    Fentanyl Qual Urine Screen Negative Screen Negative    Opiates Urine Screen Negative Screen Negative    PCP Urine Screen Negative Screen Negative       ROS: 10 point ROS neg other than the symptoms noted above in " the subjective.     I have reviewed the physical done by ED provider on 12/28/2024. Notable changes include: none              Impression:     This patient is a 24 year old year old  male with a past psychiatric history of  Bipolar 1 and ANTWON, who presented to the ED on 12/28/2024  for psychosis and leigh ann.  Prior to presenting to the ED Brian was developing leigh ann symptoms (see HPI and phone call to mom).  The last time he had an episode was in 2021.  He was diagnosed with bipolar in 2019 according to his mom.     Today, Brian met with writer in the morning and responded to questions in such a way as to be able to discharge.  However, review of the nursing notes showed he had been in seclusion the night before and received several prn medications.  He exhibited poor boundaries and required a lot of redirection. After discussing recommendations with him, he reported he was agreeable to admission and medication adjustments. The 72HH was discontinued since he reported being willing to follow treatment recommendations.  Brian knows the hold will be restarted if he tries to elope again or requests to discharge before he is stable. He planned to call his boss about not working.      Current medications are listed above.   Recommended medication adjustments are listed below.  Continue to recommend acute inpatient stabilization as indicated by ongoing mixed bipolar symptoms. .  Other recommendations are listed below.    Risk for harm is elevated.    Brief Therapeutic Intervention(s):   Provided rappport building, active listening, unconditional positive regard, and validation.    Legal Status: 72 Hold signed discontinued since patient is agreeable to treatment recommendations                           PATIENT IS HOLDABLE IF HE CHANGES HIS MIND AND REQUEST TO DISCHARGE OR TRIES TO ELOPE.            Diagnoses:     Principal Diagnosis: Bipolar 1 disorder, mixed episode.     Secondary psychiatric diagnoses of concern  this admission:  ANTWON by hx    Current medical diagnosis being treated:   none         Recommendations:     Discussed the case and writer's recommendations with Dr Maninder Marie MD, ED provider, writer asked if writer should enter orders in the EHR, the ED provider agreed.     Recommend inpatient based on ongoing bipolar mixed symptoms  2.   Increase Seroquel to 300 mg hs for bipolar disorder and sleep. Reassess in AM and increase dose as indicated.   3.   Continue:     Bupropion  mg daily - for attention and focus              Lamotrigine 50 mg hs - for mood stabilization  4   Continue to consult psychiatry as needed.  5.  PATIENT IS HOLDABLE IF HE TRIES TO ELOPE OR REQUESTS TO DISCHARGE - RESTART THE 72HH.      Please call [facility]/DEC at [phone] if you have follow-up questions or wish to place another consult.     Attestation:  Patient time: 40 minutes (met with him several different time)  Reviewed labs, EKG, and relevant imagin minutes  Family/guardian/other time: 20 minutes (mom - 2 different times)  Team/BHP/ED/EC staff time: 45 minutes  Chart review: 45 minutes  Documentation: 45 minutes  Total time: 195 minutes spent on the date of the encounter.     I have provided critical care services at the bedside in the [facility] adult emergency department, evaluating the patient, reviewing notes and laboratory values and directing care. I have discussed recommendation regarding whether or not hospitalization is needed and recommendations for medications and laboratory testing with the attending emergency department provider. Marci Krueger, DNP, APRN, CNP 2024.    Disclaimer: This note consists of symbols derived from keyboarding, dictation, and/or voice recognition software. As a result, there may be errors in the script that have gone undetected.  Please consider this when interpreting information found in the chart.    Please call [facility]/DEC at [phone] if you have follow-up  questions or wish to place another consult.

## 2024-12-30 NOTE — ED NOTES
Pt is awake and up independently. Pt is calm and cooperative with cares. Pt has had to be redirected back to room for trying to interact with pts that do not want to be disturbed.

## 2024-12-30 NOTE — ED NOTES
pt up in hallway, needs frequent reminders to return to his room and not interact with pts sleeping in the hallway. Pt returns to room with no issues. Snack of mac and cheese given.

## 2024-12-30 NOTE — TELEPHONE ENCOUNTER
12:59 PM Owen at [facility] will review for potential admission.    1:19 PM Fax sent.    3:16 PM Paged Shelby.    3:17 PM Per follow up to [facility] pt is still under review and currently no additional info needed.    R: [facility] Access Inpatient Bed Call Log 12/30/24- 7:45 AM   Intake has called facilities that have not updated the bed status within the last 12 hours.  STATEWIDE            [facility] is posting 0 beds.                [facility] is posting 0 beds. [phone];   [facility] is posting 0 beds. Negative Covid required. [phone]    [facility] is posting 0 beds. Neg covid. No high school/Sue-psych. [phone]; Per call at 7:48am to [facility] not currently taking external admissions.   [facility] is posting 0 beds. [phone]:     [facility] is posting 0 beds. [phone];      [facility] is posting 1 beds. Ages 18-35; Negative covid. [phone]; Per call at 7:49am to [place] no YA, 1 child and multiple Adol beds available.   [facility] is posting 0 beds.     [facility] is posting 0 beds; [phone].             [facility] is posting 4 beds. LOW acuity. Ages 12+. Neg covid- [phone];    [facility] has 0 beds posted. No aggression. Negative Covid. Low acuity.    [facility] ([place]) is posting 0 beds. Low acuity only. Neg covid.  [phone]; Per call at 7:56am to Pat no beds available.        [facility] is posting 0 beds. Low acuity. No current aggression.     [facility] is posting 0 beds. Negative covid. [phone]  ext  [phone]; Per call at 7:52am to [facility] states Adol and adult beds are currently at capacity.   [facility] ([place]) is posting 0 beds. Negative covid.  [phone];  Per call at 7:56am to Pat no beds available.      [facility] is posting 0 beds. Low acuity. 72 HH hold preferred. Sue unit. Negative covid required. [phone]; Per call at 7:55am to Danyell  no beds available.   [facility] ([place]) is posting 0 beds. Low acuity only. Neg covid.  [phone]; Per call at 7:56am to Pat no beds available.    [facility] in [place] is posting 6 beds.  Negative covid required.   Vol only, No history of aggression, violence, or assault. No sexual offenders. No 72 HH holds. [phone]; PT NOT APPROPRIATE D/T [place] 72 HH.           [facility] is posting 2 beds. Negative covid required.  (Must have the cognitive ability to do programming. No aggressive or violent behavior or recent HX in the last 2 yrs. MH must be primary.) Always low acuity.  [phone];    [facility] has 0 beds posted. Negative covid required.  Low acuity only. Violence and aggression capped.  [phone]; Per call at 8:00am to [facility] low acuity beds are available.   [facility] is posting 2 beds. Low acuity, Negative covid required. [phone]; Per call at 8:01am to Intake RN no answer and unable to LVM.   [facility] is posting 9 beds. Negative covid required.  [phone];   [facility] [place] is posting 5 beds. Negative covid. LOW acuity. (No lines, drains, or tubes, oxygen, CPAP, IV, etc.) Must Have a Ride Home. [phone]; Per call at 8:06am to Intake 4 beds are available.   [facility] is posting 5 beds. Negative covid. (No. lines, drains, or tubes, oxygen, CPAP, IV, etc.) [phone]; Per call at 8:08am to [place] beds are available for review.   [facility] is posting 24 beds. No covid test required. [phone] Out of State; Per call at 8:09am to [place] beds for all ages are available. PT NOT APPROPRIATE D/T [place] 72 HH.             Pt remains on the work list pending appropriate bed availability.

## 2024-12-30 NOTE — TELEPHONE ENCOUNTER
R:    Martine Leggett 3:22 PM    MRN: [id number] RARS 6, 72HH dropped, pt is voluntary for admission    Updated work lists.

## 2024-12-30 NOTE — ED NOTES
This writer arrived to the pt room and security had pt on the ground. Per security pt was attempting to leave through the ambulance bay and when redirected back to his room pt made verbal threats, was posturing at security and punching the window in the room. A code 21 was called, MD notified and pt was given Ativan, Geodon and Benadryl. Pt was moved to seclusion room but agreed to contract for safety and door was left open. Pt is apologetic and remorseful for his actions and is currently cooperative with cares. Pt denies any pain from the interaction.  Vitals stable. Water given.

## 2024-12-30 NOTE — ED NOTES
"Pt up walking in hallway. Pt advised that other pts are sleeping and we needed to talk quietly. Pt states \"time is not real\" and he has to go to work his 9-5 job/ Pt is becoming agitated that he is leaving or he is going to \"fuck things up\". Pt is redirected to his room and Terry team contacted. Pt is upset and becoming belligerent but had been redirectable.   "

## 2024-12-30 NOTE — ED NOTES
IP MH Referral Acuity Rating Score (RARS)    LMHP complete at referral to IP MH, with DEC; and, daily while awaiting IP MH placement. Call score to PPS.  CRITERIA SCORING   New 72 HH and Involuntary for IP MH (not adolescent) 0/1   Boarding over 24 hours 1/1   Vulnerable adult at least 55+ with multiple co morbidities; or, Patient age 11 or under 0/1   Suicide ideation without relief of precipitating factors 0/1   Current plan for suicide 0/1   Current plan for homicide 0/1   Imminent risk or actual attempt to seriously harm another without relief of factors precipitating the attempt 0/1   Severe dysfunction in daily living (ex: complete neglect for self care, extreme disruption in vegetative function, extreme deterioration in social interactions) 1/1   Recent (last 2 weeks) or current physical aggression in the ED 0/1   Restraints or seclusion episode in ED 0/1   Verbal aggression, agitation, yelling, etc., while in the ED 1/1   Active psychosis with psychomotor agitation or catatonia 1/1   Need for constant or near constant redirection (from leaving, from others, etc).  1/1   Intrusive or disruptive behaviors 1/1   TOTAL Acuity Total Score: 6

## 2024-12-30 NOTE — TELEPHONE ENCOUNTER
R: 3:38 PM pt declined for 6a by Shelby due to pt and unit acuity. Escalated to Dr. Manrique.    4:05 PM Dr Manrique supports the decline.     5:01 PM Declined by PC for being to acuity for their capabilities.

## 2024-12-30 NOTE — ED NOTES
Writer faxed an SHAHNAZ for pts mother x2. Please have pt date/sign and fax back to [facility]: [phone]      Writer attempted to send fax for the third time. Per unit, fax must not be working on their end. Writer updated clinician.

## 2024-12-30 NOTE — TELEPHONE ENCOUNTER
R: [place]  Access Inpatient [place] Call Log 12/29/24  @ 3:11 PM:   Intake has called facilities that have not updated the bed status within the last 12 hours.             [facility] is posting 0 beds.               [facility] is posting 0 beds. [phone]; per call at 7:05 am to Jimy, they are at cap.    [facility] is posting 0 beds. Negative Covid required. [phone]   [facility] is posting 0 beds. Neg covid. No high school/Sue-psych. [phone]; per call at 7:06 am to Kavitha, they are at cap.    [facility] is posting 0 beds. [phone]:    [facility] is posting 0 beds. [phone];     [facility] is posting 1 young adult beds. Ages 18-35; Negative covid. [phone]; PER FATHI @ CAPACITY   [facility] is posting 0 beds.    [facility] ([facility] is posting 0 beds; [phone].           [facility] is posting 5 beds. LOW acuity. Ages 12+. Neg covid- [phone]; PT IS NOT APPROPRIATE D/T AGGRESSION   [facility] has 0 beds posted. No aggression. Negative Covid. Low acuity.   [facility] ([place] is posting 0 beds. Low acuity only. Neg covid.  [phone];    [facility] is posting 0 beds. Low acuity. No current aggression.    [facility] is posting 0 beds. Negative covid. [phone]  ext  [phone]; per call at 7:11 am, recording said that their adult and adol units are at cap.    [facility] ([place] is posting 0 beds. Negative covid.  [phone];   [facility] is posting 0 beds. Low acuity. 72 HH hold preferred. Sue unit. Negative covid required. [phone]; per call at 7:14 am to Leydi, they are at cap.    [facility] ([place]) is posting 0 beds. Low acuity only. Neg covid.  [phone]; per call at 7:16 am to  Gael, they are at cap.     [facility] in [place] is posting 6 beds.  Negative covid required.   Vol only, No history of aggression, violence, or assault. No  sexual offenders. No 72  holds. [phone];  PT IS NOT APPROPRIATE D/T 72HH          [facility] is posting 3 beds. Negative covid required.  (Must have the cognitive ability to do programming. No aggressive or violent behavior or recent HX in the last 2 yrs. MH must be primary.) Always low acuity.  [phone]; PT IS NOT APPROPRIATE D/T AGGRESSION   [facility] has 0 beds posted. Negative covid required.  Low acuity only. Violence and aggression capped.  [phone]; per call at 7:18 am to Bonnie, she will ask her charge nurse to call us back re: bed avail info.    [facility] is posting 2 beds. Low acuity, Negative covid required. [phone]; per call at 7:21 am to Liss, they have 2 beds avail but have 3 pts in their ED who may need the beds. She said we can call again to check later.    [facility] is posting 10 beds. Negative covid required.  [phone]; per Jasmine's call to SELVIN Hurt at 7:10 am, they have 5 step-down/low acuity beds avail.  DECLINED D/T NEEDING MHICU BED  [facility]. Negative covid. LOW acuity. (No lines, drains, or tubes, oxygen, CPAP, IV, etc.) Must Have a Ride Home. [phone]; per call at 7:27 am to Ronnie, they have 5 beds avail    [facility] is posting 5 beds. Negative covid. (No. lines, drains, or tubes, oxygen, CPAP, IV, etc.) [phone]; per call at 7:29 am to Kelsie, they have beds avail (specific number unknown) including 1 high acuity bed.              Pt remains on the work list pending appropriate bed availability.

## 2024-12-30 NOTE — TELEPHONE ENCOUNTER
R:  [facility] Access Inpatient Bed Call Log 12/30/24 @ 12:08AM  Intake has called facilities that have not updated their bed status within the last 12 hours.    STATEWIDE    [facility]: No appropriate bed available   [facility]: @ cap per website  [facility]: @ cap per website  [facility]: @ cap per website. Per Memo Kramer @ 12:26AM, they are at capacity   [facility]: @ cap per website   [facility]: @ cap per website  [facility]: Posting 1 beds (Young Adult unit ages 18-35: No recent aggressions, violence or sexual assault. Neg covid required). Called @ 12:29AM - no answer. [facility]: @ cap per website   [facility]: @ cap per website  [facility]: Posting 5 beds. Mixed unit/Low acuity only. Pt not appropriate d/t hx of aggression   [facility]: @ cap per website. Low acuity. No aggression.   [facility]: @ cap per website   [facility]: @ cap per website. Low acuity only. No aggression.   [facility]: Does not review after 10PM.     [facility]: @ cap for all 3 [facility].  Per Naila @ 12:27AM, they are at full capacity  [facility]: Posting 6 beds (No hx of aggression. No sexual offenders. Voluntary patients only). Meets exclusionary d/t 72 [facility]: Posting 2 beds (Always low acuity only. Must have the cognitive ability to do programming. No aggressive or violent behavior or recent HX in the last 2 yrs. MH must be primary). Pt not appropriate d/t hx of aggression   [facility]: @ cap per website; Low acuity only, Violence/aggression capped.   [facility]: Posting 2 beds (Low acuity, Neg Covid). Per Patrice @ 12:29AM, they are full  [facility]: Posting 14 beds. Per Marivel @ 12:12AM, SD/low acuity beds only. Declined for [place] bed 12/29 - needs MHICU bed  [facility]: Posting 5 beds (No hx of aggression/assault. No lines, drains or tubes. Does not provide detox or CD treatment. Must have confirmed ride home upon  discharge). Per previous calls made by this writer, they do not take referrals overnight. Call back after 8AM  [facility]: Posting 32 beds. Facility is in [place]. Per Intake policy, patients must be voluntary for placement in [place]. Pt is on a 72 HH [facility]: Posting 5 beds (Mixed unit/Low acuity). Per Minoo @ 12:09AM, general and high acuity beds, however unit is very acute and cannot take very high acuity pts (no aggression, acute psychosis nor leigh ann)     Pt remains on work list until appropriate placement is available

## 2024-12-31 ENCOUNTER — TELEPHONE (OUTPATIENT)
Dept: BEHAVIORAL HEALTH | Facility: CLINIC | Age: 24
End: 2024-12-31
Payer: COMMERCIAL

## 2024-12-31 PROCEDURE — 96372 THER/PROPH/DIAG INJ SC/IM: CPT | Performed by: EMERGENCY MEDICINE

## 2024-12-31 PROCEDURE — 250N000011 HC RX IP 250 OP 636: Performed by: EMERGENCY MEDICINE

## 2024-12-31 PROCEDURE — 250N000013 HC RX MED GY IP 250 OP 250 PS 637: Performed by: EMERGENCY MEDICINE

## 2024-12-31 PROCEDURE — 250N000013 HC RX MED GY IP 250 OP 250 PS 637: Performed by: PSYCHIATRY & NEUROLOGY

## 2024-12-31 RX ORDER — OLANZAPINE 10 MG/2ML
10 INJECTION, POWDER, FOR SOLUTION INTRAMUSCULAR ONCE
Status: DISCONTINUED | OUTPATIENT
Start: 2024-12-31 | End: 2024-12-31

## 2024-12-31 RX ORDER — LORAZEPAM 2 MG/ML
2 INJECTION INTRAMUSCULAR ONCE
Status: COMPLETED | OUTPATIENT
Start: 2024-12-31 | End: 2024-12-31

## 2024-12-31 RX ORDER — HALOPERIDOL 5 MG/ML
5 INJECTION INTRAMUSCULAR ONCE
Status: COMPLETED | OUTPATIENT
Start: 2024-12-31 | End: 2024-12-31

## 2024-12-31 RX ORDER — OLANZAPINE 10 MG/1
10 TABLET, ORALLY DISINTEGRATING ORAL
Status: COMPLETED | OUTPATIENT
Start: 2024-12-31 | End: 2024-12-31

## 2024-12-31 RX ORDER — HALOPERIDOL 5 MG/ML
2 INJECTION INTRAMUSCULAR ONCE
Status: COMPLETED | OUTPATIENT
Start: 2024-12-31 | End: 2024-12-31

## 2024-12-31 RX ORDER — QUETIAPINE FUMARATE 100 MG/1
400 TABLET, FILM COATED ORAL AT BEDTIME
Status: DISCONTINUED | OUTPATIENT
Start: 2024-12-31 | End: 2025-01-04 | Stop reason: HOSPADM

## 2024-12-31 RX ORDER — DIPHENHYDRAMINE HYDROCHLORIDE 50 MG/ML
50 INJECTION INTRAMUSCULAR; INTRAVENOUS ONCE
Status: COMPLETED | OUTPATIENT
Start: 2024-12-31 | End: 2024-12-31

## 2024-12-31 RX ORDER — OLANZAPINE 10 MG/1
10 TABLET, ORALLY DISINTEGRATING ORAL 2 TIMES DAILY
Status: DISCONTINUED | OUTPATIENT
Start: 2024-12-31 | End: 2025-01-04 | Stop reason: HOSPADM

## 2024-12-31 RX ORDER — NICOTINE 21 MG/24HR
1 PATCH, TRANSDERMAL 24 HOURS TRANSDERMAL ONCE
Status: DISCONTINUED | OUTPATIENT
Start: 2024-12-31 | End: 2025-01-01

## 2024-12-31 RX ADMIN — NICOTINE POLACRILEX 2 MG: 2 GUM, CHEWING ORAL at 04:14

## 2024-12-31 RX ADMIN — HALOPERIDOL LACTATE 2 MG: 5 INJECTION, SOLUTION INTRAMUSCULAR at 05:55

## 2024-12-31 RX ADMIN — QUETIAPINE FUMARATE 400 MG: 100 TABLET ORAL at 20:31

## 2024-12-31 RX ADMIN — BUPROPION HYDROCHLORIDE 150 MG: 150 TABLET, EXTENDED RELEASE ORAL at 09:01

## 2024-12-31 RX ADMIN — OLANZAPINE 10 MG: 10 TABLET, ORALLY DISINTEGRATING ORAL at 15:53

## 2024-12-31 RX ADMIN — OLANZAPINE 10 MG: 10 TABLET, ORALLY DISINTEGRATING ORAL at 04:08

## 2024-12-31 RX ADMIN — LORAZEPAM 2 MG: 2 INJECTION INTRAMUSCULAR; INTRAVENOUS at 05:55

## 2024-12-31 RX ADMIN — OLANZAPINE 10 MG: 10 TABLET, ORALLY DISINTEGRATING ORAL at 20:32

## 2024-12-31 RX ADMIN — DIPHENHYDRAMINE HYDROCHLORIDE 50 MG: 50 INJECTION, SOLUTION INTRAMUSCULAR; INTRAVENOUS at 05:54

## 2024-12-31 RX ADMIN — NICOTINE POLACRILEX 2 MG: 2 GUM, CHEWING ORAL at 11:38

## 2024-12-31 RX ADMIN — LORAZEPAM 2 MG: 2 INJECTION INTRAMUSCULAR; INTRAVENOUS at 21:57

## 2024-12-31 RX ADMIN — NICOTINE POLACRILEX 2 MG: 2 GUM, CHEWING ORAL at 17:52

## 2024-12-31 RX ADMIN — NICOTINE POLACRILEX 2 MG: 2 GUM, CHEWING ORAL at 13:05

## 2024-12-31 RX ADMIN — DIPHENHYDRAMINE HYDROCHLORIDE 50 MG: 50 INJECTION, SOLUTION INTRAMUSCULAR; INTRAVENOUS at 21:57

## 2024-12-31 RX ADMIN — HALOPERIDOL LACTATE 5 MG: 5 INJECTION, SOLUTION INTRAMUSCULAR at 21:57

## 2024-12-31 RX ADMIN — NICOTINE POLACRILEX 2 MG: 2 GUM, CHEWING ORAL at 09:03

## 2024-12-31 RX ADMIN — LAMOTRIGINE 50 MG: 25 TABLET ORAL at 20:27

## 2024-12-31 RX ADMIN — NICOTINE POLACRILEX 2 MG: 2 GUM, CHEWING ORAL at 03:29

## 2024-12-31 RX ADMIN — NICOTINE 1 PATCH: 21 PATCH, EXTENDED RELEASE TRANSDERMAL at 21:01

## 2024-12-31 RX ADMIN — NICOTINE POLACRILEX 2 MG: 2 GUM, CHEWING ORAL at 16:36

## 2024-12-31 RX ADMIN — HYDROXYZINE HYDROCHLORIDE 50 MG: 50 TABLET ORAL at 09:01

## 2024-12-31 RX ADMIN — NICOTINE POLACRILEX 2 MG: 2 GUM, CHEWING ORAL at 15:25

## 2024-12-31 ASSESSMENT — ACTIVITIES OF DAILY LIVING (ADL)
ADLS_ACUITY_SCORE: 41

## 2024-12-31 NOTE — ED NOTES
"0600-Pt agreed to B52 IM and tolerated well with PRASHANTH team standby.     0500- Pt attempted to walk towards exit door. RN and 1:1 attempted to redirect the pt. Pt screaming at the 1:1. Pt made comments to kita. Pt stated, \"if you don't let me pace I will try to leave.\" Pt aware he can pace in his room due to other pts in the HW. Pt stated the system is broken and someone needs to fix it. 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 Pt in/out of his room frequently and requires constant redirection. Pt is redirectable at times. MD aware.   "

## 2024-12-31 NOTE — ED NOTES
IP MH Referral Acuity Rating Score (RARS)    LMHP complete at referral to IP MH, with DEC; and, daily while awaiting IP MH placement. Call score to PPS.  CRITERIA SCORING   New 72 HH and Involuntary for IP MH (not adolescent) 1/1   Boarding over 24 hours 1/1   Vulnerable adult at least 55+ with multiple co morbidities; or, Patient age 11 or under 0/1   Suicide ideation without relief of precipitating factors 0/1   Current plan for suicide 0/1   Current plan for homicide 0/1   Imminent risk or actual attempt to seriously harm another without relief of factors precipitating the attempt 0/1   Severe dysfunction in daily living (ex: complete neglect for self care, extreme disruption in vegetative function, extreme deterioration in social interactions) 1/1   Recent (last 2 weeks) or current physical aggression in the ED 0/1   Restraints or seclusion episode in ED 0/1   Verbal aggression, agitation, yelling, etc., while in the ED 1/1   Active psychosis with psychomotor agitation or catatonia 1/1   Need for constant or near constant redirection (from leaving, from others, etc).  1/1   Intrusive or disruptive behaviors 1/1   TOTAL Acuity Total Score: 7

## 2024-12-31 NOTE — TELEPHONE ENCOUNTER
Pt too acute for beds available.     R: [place]  Access Inpatient [place] Call Log 12/30/24 @3:02 PM:   Intake has called facilities that have not updated the bed status within the last 12 hours.              [facility] is posting 0 beds.                [facility] is posting 0 beds. [phone]; Per call at 8:46am to APS will need to cb after 8am meeting.   [facility] is posting 0 beds. Negative Covid required. [phone]    [facility] is posting 0 beds. Neg covid. No high school/Sue-psych. [phone]; Per call at 7:48am to Claudia not currently taking external admissions.   [facility] is posting 0 beds. [phone]:     [facility] is posting 0 beds. [phone];      [facility] is posting 2 beds. Ages 18-35; Negative covid. [phone]; Per call at 7:49am to Owen no YA, 1 child and multiple Adol beds available.   [facility] is posting 0 beds.     [facility] ([facility] is posting 0 beds; [phone].             [facility] is posting 4 beds. LOW acuity. Ages 12+. Neg covid- [phone];    [facility] has 0 beds posted. No aggression. Negative Covid. Low acuity.    [facility] is posting 0 beds. Low acuity only. Neg covid.  [phone]; Per call at 7:56am to Pat no beds available.        [facility] is posting 0 beds. Low acuity. No current aggression.     [facility] is posting 0 beds. Negative covid. [phone]  ext  [phone]; Per call at 7:52am to [facility] states Adol and adult beds are currently at capacity.   [facility] is posting 0 beds. Negative covid.  [phone];  Per call at 7:56am to Pat no beds available.      [facility] is posting 0 beds. Low acuity. 72 HH hold preferred. Sue unit. Negative covid required. [phone]; Per call at 7:55am to Danyell, no beds available.   [facility] ([place] is posting 0 beds. Low acuity only. Neg covid.  [phone]; Per call at 7:56am to Eboni  no beds available.    [facility] in [place] is posting 6 beds.  Negative covid required.   Vol only, No history of aggression, violence, or assault. No sexual offenders. No 72 HH holds. [phone]; Per call at 7:58am to [facility] beds are available.            [facility] is posting 2 beds. Negative covid required.  (Must have the cognitive ability to do programming. No aggressive or violent behavior or recent HX in the last 2 yrs. MH must be primary.) Always low acuity.  [phone];    [facility] has 0 beds posted. Negative covid required.  Low acuity only. Violence and aggression capped.  [phone]; Per call at 8:00am to [facility] low acuity beds are available.   [facility] is posting 2 beds. Low acuity, Negative covid required. [phone]; Per call at 8:01am to Intake RN no answer and unable to LVM.   [facility] is posting 9 beds. Negative covid required.  [phone];   [facility] [place] is posting 5 beds. Negative covid. LOW acuity. (No lines, drains, or tubes, oxygen, CPAP, IV, etc.) Must Have a Ride Home. [phone]; Per call at 8:06am to Intake 4 beds are available.   [facility] is posting 5 beds. Negative covid. (No. lines, drains, or tubes, oxygen, CPAP, IV, etc.) [phone]; Per call at 8:08am to [place] beds are available for review.   [facility] is posting 24 beds. No covid test required. [phone] Out of State; Per call at 8:09am to [place] beds for all ages are available.              Pt remains on the work list pending appropriate bed availability.

## 2024-12-31 NOTE — TELEPHONE ENCOUNTER
R: [facility] Access Inpatient Bed Call Log  12/31/24 @ 1:00am   Intake has called facilities that have not updated their bed status within the last 12 hours.     *METRO:  [place] [facility]: @ capacity.  [facility]: @ cap per website. Reporting no reviews overnight.  [place] -- [facility]: @ cap per website. Low acuity  [place] -- [facility]: @ cap per website. Low acuity only.  [place] [facility]: @ cap per website.  [facility]: @ cap per website.   [facility]/ beds: POSTING 1 BED.  Per Marci, no beds available. - Ages 18-35, Voluntary only, NO aggression/physical/sexual assault, violence hx or drug abuse, or psychosis. Negative Covid   [place] [facility]: @ cap per website.   [place] [facility]: @ cap per website.  [place] -- [facility]: @ cap per website. Do not review overnight.     *STATEWIDE (by distance):  [facility]: POSTING 5 BEDS. Mixed unit. Ages 12 and up/Low acuity only.   [facility] - @ cap per website. Low acuity, No aggression.    [facility] - @ cap per website.   [facility] - @ cap per website. Low acuity only. No current aggression.  [facility] - @ cap per website. Negative Covid. Lower acuity only.   [facility] - POSTING 1 BED. Low acuity only. Prefer med-adjustment placements.  [facility] - @ cap per website. No aggression. - Only Low Acuity reviews.  [place] - [facility]: POSTING 1 BED. No aggressive behaviors. Do not review overnight.  [place] -- : POSTING 6 BEDS.  No hx of aggression. No sexual offenders. Voluntary patients only.  [place] -- [facility]: POSTING 2 BEDS. Low acuity only. Must be able to do programming. No aggression/violent behavior in 2 years. No CD treatment.  [place] -- , [facility]: POSTING 4 BEDS. Negative Covid test. Must be low acuity ONLY.  [facility]: POSTING 2 BEDS. Low acuity. Negative Covid.    [place] -- [facility]: POSTING 9 LOW ACUITY BEDS.   [facility]: POSTING 5 BEDS. No hx of aggression/assault. No lines, drains or tubes. Does not provide detox or CD treatment. Require a confirmed ride upon discharge.  [place] -- [facility]: POSTING 2 BEDS. Negative COVID. No medical devices.     Pt remains on waitlist pending appropriate placement availability.

## 2024-12-31 NOTE — ED PROVIDER NOTES
Emergency Department I-PASS Sign-out      Illness Severity: Stable    Patient Summary:  24 year old male with pertinent PMH of bipolar 1 who presented with agitation, disorganization, appears to have acute decompensation and active psychosis at this time.  Mom reports that he became physically aggressive today while she was driving him to the emergency department which is very unusual.    ED Course/treatment plan: Seen by DEC , who agrees with need for admission due to decompensated psychosis.  72-hour hold had been placed but was then dropped due to improvement and willingness to come in voluntarily.     Clinical Impression:  (F23) Acute psychosis (H)      Edited by: Supriya Cohen MD at 2024 0116    Action List:  Tests to Follow-up:  None    Medications Reconciled/Ordered:  Yes    ED Mental Health Boarding Order Set Used for Diet/PRNs/Other:  Yes    DEC, Extended Care, Psych Consult Orders:  Extended Care and Psychiatry consult ordered.    Situational Awareness & Contingency Plannin Hour Hold Status:  On 72 hour hold.  Active Orders  Legal  Emergency Hospitalization Hold (72 Hr Hold)  Frequency: Effective Now  Start Date/Time: 241   Number of Occurrences: Until Specified    Disposition:  Admit/Transfer to Behavioral Health    Boarding subsequent shift/day updates:  11:42 PM  72 hour hold had been earlier dropped as patient reported he was willing to come in voluntarily. He subsequently decompensated and suddenly attempted to run out of the department damaging the entrance to the ED in the process. He is manic, disorganized, and does not at all represent the linear organized state described in the earlier DEC note. A new 72 hour hold has been placed and psychiatry has been consulted regarding proceeding with civil commitment.    Car Mondragon MD   Emergency Medicine       Car Mondragon MD  [phone]

## 2024-12-31 NOTE — ED PROVIDER NOTES
Emergency Department I-PASS Sign-out      Illness Severity: Stable    Patient Summary:  24 year old male with pertinent PMH of bipolar 1 who presented with agitation, disorganization, appears to have acute decompensation and active psychosis at this time.  Mom reports that he became physically aggressive today while she was driving him to the emergency department which is very unusual.    ED Course/treatment plan: Seen by DEC , who agrees with need for admission due to decompensated psychosis.  72-hour hold has been placed.    Clinical Impression:  (F23) Acute psychosis (H)      Edited by: Supriya Cohen MD at 2024 0116    Action List:  Tests to Follow-up:  None    Medications Reconciled/Ordered:  Yes    ED Mental Health Boarding Order Set Used for Diet/PRNs/Other:  Yes    DEC, Extended Care, Psych Consult Orders:  Extended Care and Psychiatry consult ordered.    Situational Awareness & Contingency Plannin Hour Hold Status:  On 72 hour hold.  Active Orders  Legal  Emergency Hospitalization Hold (72 Hr Hold)  Frequency: Effective Now  Start Date/Time: 24 2214   Number of Occurrences: Until Specified    Disposition:  Admit/Transfer to Behavioral Health    Boarding subsequent shift/day updates:  24 seen by psychiatry.  Recommend holding bupropion.  Seroquel increased.  Scheduled Zyprexa added.    Edited by: Antony Jain MD at 2024 1420    Synthesis & Events after sign-out:  Patient was evaluated by psychiatry today.  He continues to be psychotic.  Medication just today include discontinuing bupropion.  Dosing of Seroquel increased.  Scheduled Zyprexa also added.        ANTONY JAIN MD, MD   Emergency Medicine     Antony Jain MD  24 0610

## 2024-12-31 NOTE — CONSULTS
"[facility]  Department of Psychiatry  Consultation note    Brian Collins MRN: [id number]   Age: 24 year old YOB: 2000     History     Chief Complaint   Patient presents with    Suicidal    Paranoid     HPI  Brian Collins is a 24 year old male with history of bipolar I disorder who came in via his mother 12/28. See initial consult for details. He says he was \"pounding on my mom's car\". He came in with manic symptoms (grandiosity, tangentiality, delusions, AH) and episodic agitation. He reported SI. He says before he came in he was only sleeping 2-3 hours a night and that this is \"weird\" for him. He says he is no longer feeling suicidal. Very early this morning he tried to elope and ended up on a 72 hour hold. He says his ultimate goal is to not need any medications but he is reconsidering that and will think things through more. He has been getting seroquel which he says he is willing to take. Was on 12.5 mg prn in the outpatient setting, that was increased to 25 mg. That has been further increased here, last night he got 300 mg at bedtime. He is also on bupropion xl 150 mg daily, lamotrigine 50 mg daily, hydroxyzine prn. He has gotten a number of prn's, got olanzapine 10 mg PO x 3 yesterday. Early this morning he got benadryl 50 mg IM, haldol 2 mg IM, ativan 2 mg IM and olanzapine 10 mg PO. UDS neg.    Has a history of manic episodes and hospitalizations. Has been on Li, VPA, olanzapine in the past. He has responded well to Li and olanzapine but does not like taking those, says olanzapine cause muscle aches and Li caused weight gain.    Past Medical History  Past Medical History:   Diagnosis Date    Bipolar 1 disorder      Past Surgical History:   Procedure Laterality Date    Hand/finger surgery Right      buPROPion (WELLBUTRIN XL) 150 MG 24 hr tablet  lamoTRIgine (LAMICTAL) 100 MG tablet  QUEtiapine (SEROQUEL) 25 MG tablet      No Active " Allergies  Family History  History reviewed. No pertinent family history.  Social History   Social History     Tobacco Use    Smoking status: Never    Smokeless tobacco: Current     Types: Chew   Substance Use Topics    Alcohol use: Not Currently    Drug use: Not Currently          Review of Systems  A medically appropriate review of systems was performed with pertinent positives and negatives noted in the HPI, and all other systems negative.    Physical Examination   BP: (!) 158/115  Pulse: 115  Temp: 98.7  F (37.1  C)  Resp: 20  Height: 182.9 cm (6')  Weight: 108.3 kg (238 lb 11.2 oz)  SpO2: 97 %    Physical Exam  General: Appears stated age.   Neuro: Alert and fully oriented. Extremities appear to demonstrate normal strength on visual inspection.   Integumentary/Skin: no rash visualized, normal color    Psychiatric Examination   Appearance: awake, alert and adequately groomed  Attitude:  cooperative  Eye Contact:  good  Mood:  better  Affect:  labile  Speech:  clear, coherent  Psychomotor Behavior:  no evidence of tardive dyskinesia, dystonia, or tics  Thought Process:  goal oriented  Associations:  no loose associations  Thought Content:   denies SI/HI, not responding to internal stimuli  Insight:  limited  Judgement:  limited  Oriented to:  time, person, and place  Attention Span and Concentration:  fair  Recent and Remote Memory:  intact  Language: able to name/identify objects without impairment  Fund of Knowledge: intact with awareness of current and past events    ED Course        Labs Ordered and Resulted from Time of ED Arrival to Time of ED Departure   URINE DRUG SCREEN PANEL - Normal       Result Value    Amphetamines Urine Screen Negative      Barbituates Urine Screen Negative      Benzodiazepine Urine Screen Negative      Cannabinoids Urine Screen Negative      Cocaine Urine Screen Negative      Fentanyl Qual Urine Screen Negative      Opiates Urine Screen Negative      PCP Urine Screen Negative          Assessments & Plan (with Medical Decision Making)   Patient with bipolar I disorder presenting with acute manic symptoms. He continues to require multiple prn medications and has been declined by inpatient due to milieu and patient acuity. He is willing to take seroquel, does not like taking olanzapine but responds favorably to that. He is on a 72 hour hold after attempting to elope and remains on the list for an inpatient bed. Based on descriptions in chart review it seems he has had a little bit of improvement but he remains in need of inpatient stabilization.     I have reviewed the assessment completed by the [facility].     Preliminary diagnosis:    ICD-10-CM    1. Bipolar I disorder, manic             Treatment Plan:  -Patient is on the list for an inpatient bed  -Recommend holding bupropion, though less likely than other antidepressants to induce leigh ann should still be held for acute manic/mixed episode  -Recommend increasing seroquel to 400 mg at bedtime  -Would schedule olanzapine 10 mg BID temporarily. Though he does not prefer that medication, it would be beneficial to get more rapid symptom control so he will be acceptable to an inpatient unit. Can be discontinued when he is more stable  -Would keep the 72 hour hold      --  Brenna Scott MD   [facility] EMERGENCY DEPARTMENT

## 2024-12-31 NOTE — TELEPHONE ENCOUNTER
9:47 AM: Intake paged [place] to present for station 10.    10:54 AM: Second page sent for review.    11:31 AM: Pt declined for station 10 d/t current milieu and Pt acuity. Unit currently has 3 SIOs and cannot accommodate another.    11:45 AM: Intake paged [place] to escalate decline.    12:40 PM: Parkinson supports decline.            R: [place] Call Log 12/31/24 8:15 AM: Intake has called facilities that have not updated the bed status within the last 12 hours.                               [facility] is at capacity.           [facility] is posting 0 beds. [phone]   [facility] is posting 0 beds. Negative covid required.  [facility] is posting 0 beds. Neg covid. No high school/Sue-psych.   [place] is posting 0 beds. [phone]  [facility] is posting 0 beds. [phone]   [facility] is posting 2 beds. Negative covid. Per call @8:17am  [facility] [place] ([facility] is posting 0 beds [phone].    [facility] is posting 5 beds. LOW acuity. Ages 12+. Neg covid- [phone]  [facility] has 0 beds posted. No aggression. Negative Covid. Low acuity.  [facility] is posting 0 beds. Low acuity only. Neg covid.  [phone]   [facility] is posting 0 beds. Low acuity. No current aggression.    [facility] is posting 0 beds. Negative covid. [phone]   [facility] is posting 0 beds available. Negative covid.  [phone].      [facility] is posting 0 beds. Low acuity. 72 HH hold preferred. Sue unit. Negative covid required. [phone]   [facility] is posting 0 beds. Low acuity only. Neg covid.  [phone]   [facility] in [place] is posting 7 beds.  Negative covid required.   Vol only, No history of aggression, violence, or assault. No sexual offenders. No 72 HH holds. [phone]     [facility] is posting 2  beds. Negative covid required.  (Must have the cognitive ability to do programming. No aggressive or violent behavior or recent HX in the last 2 yrs. MH must be primary.) Always low acuity.   [facility] has 4 beds posted. Negative covid required.  Low acuity only. Violence and aggression capped.  [phone]   [facility] is posting 2 beds. Low acuity, Negative covid required. [phone]   [facility] posting 10 beds. Negative covid required.  [phone]   [facility] [place] is posting 5 beds. Negative covid. LOW acuity. (No lines, drains, or tubes, oxygen, CPAP, IV, etc.) Must Have a Ride Home. [phone]   [facility] is posting 2 beds. Negative covid. (No. lines, drains, or tubes, oxygen, CPAP, IV, etc.) [phone]     Pt remains on the work list pending appropriate bed availability.

## 2024-12-31 NOTE — PROGRESS NOTES
"Triage & Transition Services, Extended Care     Therapy Progress Note    Patient: Brian goes by \"Brian,\" uses he/him pronouns  Date of Service: December 31, 2024  Site of Service: [facility] EMERGENCY DEPARTMENT                             ED10  Patient was seen yes  Mode of Assessment: Virtual: iPad    Presentation Summary: Pt reported that his mood is \"flat with occasional spikes.\"  However, he stated that it is improving and starting to level out.  Pt stated that the Zyprexa is the most effective way to level out his moods.  He stated that he really doesn't like to take medication but has started to see there is value in the medication.  Pt denied depression and anxiety.  He denied hallucinations.  However, later in the conversation, he talked about voices.  He stated that currently the voices are on the same page and only talk to him when he needs them to.  Pt reported that he has been doing some pacing due to restlessness and agitation.  He stated that he has no plans to try to elope again.  Pt stated that he is not going leave the hospital until everyone agrees he is ready to leave.  He stated, \"If they think I'm not ready, then I'm probably not.\"  Pt reported that he slept 4 hours last night.  He stated that he does not have much of an appetite but eats the food that is brought to him.  He denied suicidal and homicidal thoughts.    Therapeutic Intervention(s) Provided: Coached on coping techniques/relaxation skills to help improve distress tolerance and managing intense emotions., Provided positive reinforcement for progress towards goals, gains in knowledge, and application of skills previously taught.    Current Symptoms: racing thoughts difficulty concentrating, impaired decision making anxious, wandering agitation      Mental Status Exam   Affect: Appropriate  Appearance: Appropriate  Attention Span/Concentration: Attentive  Eye Contact: Engaged    Fund of Knowledge: Appropriate   Language " /Speech Content: Fluent  Language /Speech Volume: Normal  Language /Speech Rate/Productions: Normal  Recent Memory: Intact  Remote Memory: Intact  Mood: Normal  Orientation to Person: Yes   Orientation to Place: Yes  Orientation to Time of Day: Yes  Orientation to Date: Yes     Situation (Do they understand why they are here?): Yes  Psychomotor Behavior: Normal  Thought Content: Clear  Thought Form: Goal Directed    Treatment Objective(s) Addressed: rapport building, processing feelings, identifying treatment goals, assessing safety    Patient Response to Interventions: acceptance expressed    Progress Towards Goals: Patient Reports Symptoms Are: improving  Patient Progress Toward Goals: is making progress  Comment: Pt has shown some improvement in symptoms but would benefit from further stabilization.  Next Step to Work Toward Discharge: symptom stabilization  Symptom Stabilization Comment: Pt is currently on the list for IPMH  Collaboration Comment: Psych to call pt mother after pt does to determine baseline presentation. Pt has not returned to baseline and would benefit from IP MH admission for further med mgmt on increased dose, monitoring and stabilization of symptoms.    Case Management:      Plan: inpatient mental health  yes provider, other (see comment) Dr. Martinez and Dr. Scott (psychiatry)  Dr. Scott indicated that we do not need to petition for commitment at this time and he is currently ambivalent about treatment.  This will continue to be monitored.  yes    Clinical Substantiation: Pt was placed back on a 72HH last evening after trying to elope from the ED.  He has shown some improvement in symptoms since admission to the ED on 12/28/24, however, continues to need IPMH for further stabilization.    Legal Status: Legal Status: 72 Hour Hold  72 Hour Hold - Date/Time Initiated: 12/30/24 2339  72 Hour Hold - Date/Time Ends: 1/3/25 7189    Session Status: Time session started: 1440  Time session ended:  1456  Session Duration (minutes): 16 minutes  Session Number: 2  Anticipated number of sessions or this episode of care: 4    Time Spent: 16 minutes    CPT Code: CPT Codes: 37983 - Psychotherapy (with patient) - 30 (16-37*) min    Diagnosis:   Patient Active Problem List   Diagnosis Code    Bipolar affective disorder, current episode manic (H) F31.10    Psychosis (H) F29       Primary Problem This Admission: Active Hospital Problems    Bipolar affective disorder, current episode manic (H) F31.1      *Psychosis (H) F29        [employer]   Licensed Mental Health Professional (LMHP), [facility]  [phone]

## 2025-01-01 ENCOUNTER — TELEPHONE (OUTPATIENT)
Dept: BEHAVIORAL HEALTH | Facility: CLINIC | Age: 25
End: 2025-01-01
Payer: COMMERCIAL

## 2025-01-01 ENCOUNTER — MEDICAL CORRESPONDENCE (OUTPATIENT)
Dept: HEALTH INFORMATION MANAGEMENT | Facility: CLINIC | Age: 25
End: 2025-01-01
Payer: COMMERCIAL

## 2025-01-01 PROCEDURE — 250N000013 HC RX MED GY IP 250 OP 250 PS 637: Performed by: PSYCHIATRY & NEUROLOGY

## 2025-01-01 PROCEDURE — 250N000013 HC RX MED GY IP 250 OP 250 PS 637: Performed by: EMERGENCY MEDICINE

## 2025-01-01 PROCEDURE — 250N000013 HC RX MED GY IP 250 OP 250 PS 637: Performed by: FAMILY MEDICINE

## 2025-01-01 RX ORDER — NICOTINE 21 MG/24HR
1 PATCH, TRANSDERMAL 24 HOURS TRANSDERMAL DAILY
Status: DISCONTINUED | OUTPATIENT
Start: 2025-01-01 | End: 2025-01-04 | Stop reason: HOSPADM

## 2025-01-01 RX ADMIN — NICOTINE POLACRILEX 2 MG: 2 GUM, CHEWING ORAL at 07:52

## 2025-01-01 RX ADMIN — OLANZAPINE 10 MG: 10 TABLET, ORALLY DISINTEGRATING ORAL at 08:02

## 2025-01-01 RX ADMIN — QUETIAPINE FUMARATE 400 MG: 100 TABLET ORAL at 20:34

## 2025-01-01 RX ADMIN — NICOTINE POLACRILEX 2 MG: 2 GUM, CHEWING ORAL at 17:34

## 2025-01-01 RX ADMIN — LAMOTRIGINE 50 MG: 25 TABLET ORAL at 20:33

## 2025-01-01 RX ADMIN — HYDROXYZINE HYDROCHLORIDE 50 MG: 50 TABLET ORAL at 16:11

## 2025-01-01 RX ADMIN — OLANZAPINE 10 MG: 10 TABLET, ORALLY DISINTEGRATING ORAL at 20:33

## 2025-01-01 RX ADMIN — NICOTINE POLACRILEX 2 MG: 2 GUM, CHEWING ORAL at 10:02

## 2025-01-01 RX ADMIN — HYDROXYZINE HYDROCHLORIDE 50 MG: 50 TABLET ORAL at 04:19

## 2025-01-01 RX ADMIN — NICOTINE 1 PATCH: 21 PATCH, EXTENDED RELEASE TRANSDERMAL at 14:09

## 2025-01-01 RX ADMIN — NICOTINE POLACRILEX 2 MG: 2 GUM, CHEWING ORAL at 12:33

## 2025-01-01 RX ADMIN — Medication 3 MG: at 04:19

## 2025-01-01 RX ADMIN — NICOTINE POLACRILEX 2 MG: 2 GUM, CHEWING ORAL at 19:55

## 2025-01-01 RX ADMIN — OLANZAPINE 10 MG: 10 TABLET, ORALLY DISINTEGRATING ORAL at 03:47

## 2025-01-01 ASSESSMENT — ACTIVITIES OF DAILY LIVING (ADL)
ADLS_ACUITY_SCORE: 41

## 2025-01-01 NOTE — TELEPHONE ENCOUNTER
R: [place]  Access Inpatient [place] Call Log 12/31/24 @4:00 PM:   Intake has called facilities that have not updated the bed status within the last 12 hours.                   Pt is on 72HH.  Statewide only.               [facility] is at capacity.           [facility] is posting 0 beds. [phone]   [facility] is posting 0 beds. Negative covid required.  [facility] is posting 0 beds. Neg covid. No high school/Sue-psych.   [place] is posting 0 beds. [phone]  [facility] is posting 0 beds. [phone]   [facility] is posting 3 Young Adult beds. Negative covid. Per call @8:17am  [facility] [place] ([facility]) is posting 0 beds [phone].    [facility] is posting 6 beds. LOW acuity. Ages 12+. Neg covid- [phone]  [facility] has 0 beds posted. No aggression. Negative Covid. Low acuity.  [facility] is posting 0 beds. Low acuity only. Neg covid.  [phone]   [facility] is posting 0 beds. Low acuity. No current aggression.    [facility] is posting 0 beds. Negative covid. [phone]   [facility] is posting 0 beds available. Negative covid.  [phone].      [facility] is posting 0 beds. Low acuity. 72 HH hold preferred. Sue unit. Negative covid required. [phone]   [facility] is posting 0 beds. Low acuity only. Neg covid.  [phone]   [facility] in [place] is posting 6 beds.  Negative covid required.   Vol only, No history of aggression, violence, or assault. No sexual offenders. No 72 HH holds. [phone]     [facility] is posting 1 beds. Negative covid required.  (Must have the cognitive ability to do programming. No aggressive or violent behavior or recent HX in the last 2 yrs. MH must be primary.) Always low acuity.   [facility] has 0 beds posted. Negative covid required.  Low acuity only. Violence and  aggression capped.  [phone]   [facility] is posting 2 beds. Low acuity, Negative covid required. [phone]   [facility]. Negative covid required.  [phone]   [facility] [place] is posting 5 beds. Negative covid. LOW acuity. (No lines, drains, or tubes, oxygen, CPAP, IV, etc.) Must Have a Ride Home. [phone]   [facility] is posting 3 beds. Negative covid. (No. lines, drains, or tubes, oxygen, CPAP, IV, etc.) [phone]       Pt remains on the work list pending appropriate bed availability.

## 2025-01-01 NOTE — ED PROVIDER NOTES
Emergency Department I-PASS Sign-out      Illness Severity: Stable    Patient Summary:  24 year old male with pertinent PMH of bipolar 1 who presented with agitation, disorganization, appears to have acute decompensation and active psychosis at this time.  Mom reports that he became physically aggressive today while she was driving him to the emergency department which is very unusual.    ED Course/treatment plan: Seen by DEC , who agrees with need for admission due to decompensated psychosis.  72-hour hold has been placed.    Clinical Impression:  (F23) Acute psychosis (H)      Edited by: Supriya Cohen MD at 2024 0116    Action List:  Tests to Follow-up:  None    Medications Reconciled/Ordered:  Yes    ED Mental Health Boarding Order Set Used for Diet/PRNs/Other:  Yes    DEC, Extended Care, Psych Consult Orders:  Extended Care and Psychiatry consult ordered.    Situational Awareness & Contingency Plannin Hour Hold Status:  On 72 hour hold.  Active Orders  Legal  Emergency Hospitalization Hold (72 Hr Hold)  Frequency: Effective Now  Start Date/Time: 24 2214   Number of Occurrences: Until Specified    Disposition:  Admit/Transfer to Behavioral Health    Boarding subsequent shift/day updates:  24 seen by psychiatry.  Recommend holding bupropion.  Seroquel increased.  Scheduled Zyprexa added.    Edited by: Antony Martinez MD at 2024 142    Synthesis & Events after sign-out:  No new events          Enmanuel Francis MD   Emergency Medicine     Enmanuel Francis MD  25 1134

## 2025-01-01 NOTE — ED NOTES
Gerry informed that pt is standing in the room, drowsy and unsteady on his feet. Asked pt to sit down or lay down on the mat, pt slammed his hand on the door. Staff encouraged him to sit down before he falls. Pt sitting on the mat in the room at this time. 1:1 continues.

## 2025-01-01 NOTE — ED PROVIDER NOTES
9:45 PM patient sprinted out of his room screaming banging on doors.  Behavioral code called with security physically restraining patient with acute threat of harm to self and others.  Patient calm quickly with verbal redirection and physical restraint.  With show of force and security presence, patient was able to calmly walked back to room with guidance.  Secondary to severe, rapid escalation patient taken for seclusion to monitor for symptoms and de-escalation.  RN familiar with patient reports significant improvement last evening with similar escalation after B-52.  This was reordered to assist with de-escalation of symptoms and acute psychosis.     Ramsey Lin MD  12/31/24 8340

## 2025-01-01 NOTE — TELEPHONE ENCOUNTER
R: [place]  Access Inpatient Bed Call Log 1/1/25 @5:00 PM  Intake has called facilities that have not updated the bed status within the last 12 hours.                           (Adults):     [facility] is at capacity.             [facility] is posting 0 beds. [phone]    [facility] is posting 0 beds. Negative covid required.    [facility] is posting 0 beds. Neg covid. No high school/Sue-psych. 7:35 AM Per Ayla 0 beds currently available.    [facility] is posting 0 beds. [phone]    [facility] is posting 0 beds. [phone]    [facility] is posting 3 Young Adult beds. Negative covid. [phone]    [facility] is posting 0 beds [phone].       [facility] is posting 7 beds. LOW acuity. Ages 12+. Neg covid- [phone]    [facility] has 0 beds posted. No aggression. Negative Covid. Low acuity.    [facility] is posting 0 beds. Low acuity only. Neg covid.  [phone]     [facility] is posting 0 beds. Low acuity. No current aggression.     [facility] is posting 0 beds. Negative covid. [phone]    [facility] is posting 0 beds available. Negative covid.  [phone].        [facility] is posting 0 beds. Low acuity. 72 HH hold preferred. Sue unit. Negative covid required. [phone]    [facility] is posting 0 beds. Low acuity only. Neg covid.  [phone]    [facility] [place] is posting 5 beds.  Negative covid required.   Vol only, No history of aggression, violence, or assault. No sexual offenders. No 72 HH holds. [phone]       [facility] is posting 3 beds. Negative covid required.  (Must have the cognitive ability to do programming. No aggressive or violent behavior or recent HX in the last 2 yrs. MH must be primary.) Always low acuity.    [facility] has 0 beds posted. Negative  "covid required.  Low acuity only. Violence and aggression capped.  [phone]    [facility] is posting 2 beds. Low acuity, Negative covid required. [phone]    [facility] posting 4 beds. Negative covid required.  [phone]             [facility]. Negative covid. LOW acuity. (No lines, drains, or tubes, oxygen, CPAP, IV, etc.) Must Have a Ride Home. [phone]            [facility] is posting 2 beds. Negative covid. (No. lines, drains, or tubes, oxygen, CPAP, IV, etc.) [phone]            [facility] is posting 20 beds. No covid test required. [phone] 7:57 AM Per Dariana 22 adults, 10 peds.  Not appropriate due to 72 HH     Pt remains on the work list pending appropriate bed availability.     R: 5:15 PM Unit 12 is currently reviewing pt for placement.  Awaiting update.      R: 9:20 PM commitment paperwork- \"Examiners statement in support of petition for commitment\" received through [facility], moved to Court/Hold folder in [facility].  Paperwork received 1/1/25 at 3:48 PM.  "

## 2025-01-01 NOTE — TELEPHONE ENCOUNTER
R: [place]  Access Inpatient Bed Call Log 1/1/25 @7:31 AM:  Intake has called facilities that have not updated the bed status within the last 12 hours.                                   [facility] is at capacity.                     [facility] is posting 0 beds. [phone]            [facility] is posting 0 beds. Negative covid required.            [facility] is posting 0 beds. Neg covid. No high school/Sue-psych. 7:35 AM Per Ayla 0 beds currently available.            [facility] is posting 0 beds. [phone]            [facility] is posting 0 beds. [phone]            [facility] is posting 3 Young Adult beds. Negative covid.            [facility] ([facility]) is posting 0 beds [phone].               [facility] is posting 7 beds. LOW acuity. Ages 12+. Neg covid- [phone]            [facility] has 0 beds posted. No aggression. Negative Covid. Low acuity.            [facility] ([place]) is posting 0 beds. Low acuity only. Neg covid.  [phone]             [facility] is posting 0 beds. Low acuity. No current aggression.             [facility] is posting 0 beds. Negative covid. [phone]            [facility] ([place]) is posting 0 beds available. Negative covid.  [phone].                [facility] is posting 0 beds. Low acuity. 72 HH hold preferred. Sue unit. Negative covid required. [phone]            [facility] ([place]) is posting 0 beds. Low acuity only. Neg covid.  [phone]            [facility] [place] is posting 5 beds.  Negative covid required.   Vol only, No history of aggression, violence, or assault. No sexual offenders. No 72 HH holds. [phone]               [facility] is posting 1 bed. Negative covid required.  (Must have the cognitive ability to do programming. No aggressive or violent behavior or recent  HX in the last 2 yrs. MH must be primary.) Always low acuity.            [facility] has 0 beds posted. Negative covid required.  Low acuity only. Violence and aggression capped.  [phone]            [facility] is [facility] beds. Low acuity, Negative covid required. [phone]            [place] [facility] posting 9 beds. Negative covid required.  [phone]             [facility] [place] is posting 5 beds. Negative covid. LOW acuity. (No lines, drains, or tubes, oxygen, CPAP, IV, etc.) Must Have a Ride Home. [phone]            [facility] is posting 2 beds. Negative covid. (No. lines, drains, or tubes, oxygen, CPAP, IV, etc.) [phone]            [facility] is posting 22 beds. No covid test required. [phone] 7:57 AM Per Dariana 22 adults, 10 peds.     Pt remains on the work list pending appropriate bed availability.     9:09 AM Called [facility] ED and asked about 72 HH and any court paperwork for civil commitment.  RN stated that she heard that EC was working on it but she cannot find any documentation.    9:13 AM Called EC and per Martine, Pt was not recommended for Civil Commitment and there is no court documentation at this time.    9:21 AM Called [facility] ED and Pt is now agreeing to go [facility]    11:36 AM Martine with EC called and will be meeting with the Pt and going through the Pre-Petition screening to see about filing for Civil Commitment.  Martine will update Intake.    Martine Leggett  1:27 PM  MRN: [id number] PPS submitted to Hartman Wright for review.

## 2025-01-01 NOTE — ED NOTES
Mental Health Transition and Triage-Extended Care   Civil Commitment Status Plan of Care    Current commitment status is:  72 HH    72hr-Hold Started: 12/30/24 2339   72hr-Hold Conclude: 1/3/25 2339    County [facility] Responsibility[place] [facility]    Type of Commitment Requested: JASMIN Whaley Petitions and Jose Rivas paperwork will only be completed if patient remains in the Emergency Room setting at time of their preliminary hearing.      Pre-petition screening paperwork has been sent to [facility] via email [email or web address] and dept-extended-and includes:  EXAMINER S STATEMENT IN SUPPORT OF PETITION FOR COMMITMENT and [employer] Petition for Judicial Commitment.    Supplemental documentation has also been send to the [facility] including: N/A    Pre-Petitions forms have been sent to Intake and Stat to be scanned to the EMR.      Verbal report given to [facility] commitment intake team via phone on 01/01/25 at  13:13 [phone], left VM due to court holiday closure.     Next steps: admit to [facility], continue to partner with [facility].

## 2025-01-01 NOTE — TELEPHONE ENCOUNTER
R: [facility] Access Inpatient Bed Call Log  1/1/25 @ 1:00am   Intake has called facilities that have not updated their bed status within the last 12 hours.     *METRO:  [place] -- [facility]: @ capacity.  [facility]: @ cap per website. Reporting no reviews overnight.  [place] -- [facility]: @ cap per website. Low acuity  [place] -- [facility]: @ cap per website. Low acuity only.  [place] -- [facility]: @ cap per website.  [facility]: @ cap per website.   [facility]/ beds: POSTING 3 BEDS.  Per Marci, no beds available. - Ages 18-35, Voluntary only, NO aggression/physical/sexual assault, violence hx or drug abuse, or psychosis. Negative Covid   [place] -- [facility]: @ cap per website.   [place] -- [facility]: @ cap per website.  [place] -- [facility]: @ cap per website. Do not review overnight.     *STATEWIDE (by distance):  [facility]: POSTING 7 BEDS. Mixed unit. Ages 12 and up/Low acuity only.   [facility] - @ cap per website. Low acuity, No aggression.    [facility] - @ cap per website.   [facility] - @ cap per website. Low acuity only. No current aggression.  [facility] - @ cap per website. Negative Covid. Lower acuity only.   [facility] - @ cap per website. Low acuity only. Prefer med-adjustment placements.  [facility] - @ cap per website. No aggression. - Only Low Acuity reviews.  [place] - [facility]: @ cap per website. No aggressive behaviors. Do not review overnight.  [place] -- [facility]: POSTING 5 BEDS.  No hx of aggression. No sexual offenders. Voluntary patients only.  [place] -- [facility]: POSTING 1 BED. Low acuity only. Must be able to do programming. No aggression/violent behavior in 2 years. No CD treatment.  [place] [facility]: @ cap per website. Negative Covid test. Must be low acuity ONLY.  [place]  [facility]: POSTING 2 BEDS. Low acuity. Negative Covid.    [place] -- [facility]: POSTING 9 LOW ACUITY BEDS.   [place] - [facility]: POSTING 5 BEDS. No hx of aggression/assault. No lines, drains or tubes. Does not provide detox or CD treatment. Require a confirmed ride upon discharge.  [place] -- [facility]: POSTING 3 BEDS. Negative COVID. No medical devices.     Pt remains on waitlist pending appropriate placement availability.
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
## 2025-01-02 ENCOUNTER — TELEPHONE (OUTPATIENT)
Dept: BEHAVIORAL HEALTH | Facility: CLINIC | Age: 25
End: 2025-01-02
Payer: COMMERCIAL

## 2025-01-02 VITALS
HEART RATE: 109 BPM | OXYGEN SATURATION: 96 % | BODY MASS INDEX: 32.33 KG/M2 | HEIGHT: 72 IN | WEIGHT: 238.7 LBS | SYSTOLIC BLOOD PRESSURE: 150 MMHG | RESPIRATION RATE: 16 BRPM | TEMPERATURE: 98.2 F | DIASTOLIC BLOOD PRESSURE: 98 MMHG

## 2025-01-02 PROCEDURE — 250N000013 HC RX MED GY IP 250 OP 250 PS 637: Performed by: EMERGENCY MEDICINE

## 2025-01-02 PROCEDURE — 250N000013 HC RX MED GY IP 250 OP 250 PS 637: Performed by: PSYCHIATRY & NEUROLOGY

## 2025-01-02 RX ADMIN — HYDROXYZINE HYDROCHLORIDE 50 MG: 50 TABLET ORAL at 13:17

## 2025-01-02 RX ADMIN — Medication 3 MG: at 00:08

## 2025-01-02 RX ADMIN — NICOTINE POLACRILEX 2 MG: 2 GUM, CHEWING ORAL at 19:10

## 2025-01-02 RX ADMIN — QUETIAPINE FUMARATE 400 MG: 100 TABLET ORAL at 21:27

## 2025-01-02 RX ADMIN — OLANZAPINE 10 MG: 10 TABLET, ORALLY DISINTEGRATING ORAL at 11:04

## 2025-01-02 RX ADMIN — NICOTINE POLACRILEX 2 MG: 2 GUM, CHEWING ORAL at 08:21

## 2025-01-02 RX ADMIN — OLANZAPINE 10 MG: 10 TABLET, ORALLY DISINTEGRATING ORAL at 08:36

## 2025-01-02 RX ADMIN — NICOTINE POLACRILEX 2 MG: 2 GUM, CHEWING ORAL at 17:24

## 2025-01-02 RX ADMIN — ACETAMINOPHEN 650 MG: 325 TABLET, FILM COATED ORAL at 06:36

## 2025-01-02 RX ADMIN — NICOTINE POLACRILEX 2 MG: 2 GUM, CHEWING ORAL at 06:37

## 2025-01-02 RX ADMIN — NICOTINE POLACRILEX 2 MG: 2 GUM, CHEWING ORAL at 10:09

## 2025-01-02 RX ADMIN — NICOTINE POLACRILEX 2 MG: 2 GUM, CHEWING ORAL at 21:27

## 2025-01-02 RX ADMIN — OLANZAPINE 10 MG: 10 TABLET, ORALLY DISINTEGRATING ORAL at 04:01

## 2025-01-02 RX ADMIN — LAMOTRIGINE 50 MG: 25 TABLET ORAL at 21:27

## 2025-01-02 RX ADMIN — NICOTINE POLACRILEX 2 MG: 2 GUM, CHEWING ORAL at 13:12

## 2025-01-02 RX ADMIN — HYDROXYZINE HYDROCHLORIDE 50 MG: 50 TABLET ORAL at 00:08

## 2025-01-02 RX ADMIN — NICOTINE POLACRILEX 2 MG: 2 GUM, CHEWING ORAL at 15:12

## 2025-01-02 ASSESSMENT — ACTIVITIES OF DAILY LIVING (ADL)
ADLS_ACUITY_SCORE: 41

## 2025-01-02 NOTE — ED NOTES
LMHP sent progress notes to PPS at [facility] as requested for collateral.

## 2025-01-02 NOTE — TELEPHONE ENCOUNTER
[place]: [place]  Access Inpatient Bed Call Log 1/2/25 @8:18 AM:  Intake has called facilities that have not updated the bed status within the last 12 hours.          (Adults):     [facility] is at capacity.             [facility] is posting 0 beds. [phone]    [facility] is posting 0 beds. Negative covid required.    [facility] is posting 0 beds. Neg covid. No high school/Sue-psych. 7:35 AM Per Staples 0 beds currently available.    [facility] is posting 0 beds. [phone]    [facility] is posting 0 beds. [phone]    [facility] is posting 4 Young Adult beds. Negative covid. [phone]    [facility] is posting 0 beds [phone].       [facility] is posting 7 beds. LOW acuity. Ages 12+. Neg covid- [phone]    [facility] has 0 beds posted. No aggression. Negative Covid. Low acuity.    [facility] is posting 0 beds. Low acuity only. Neg covid.  [phone]     [facility] is posting 0 beds. Low acuity. No current aggression.     [facility] is posting 0 beds. Negative covid. [phone]    [facility] ([place]) is posting 0 beds available. Negative covid.  [phone].        [facility] is posting 0 beds. Low acuity. 72 HH hold preferred. Sue unit. Negative covid required. [phone]    [facility] ([place]) is posting 0 beds. Low acuity only. Neg covid.  [phone]    [facility] in [place] is posting 4 beds.  Negative covid required.   Vol only, No history of aggression, violence, or assault. No sexual offenders. No 72 HH holds. [phone]       [facility] is posting 3 beds. Negative covid required.  (Must have the cognitive ability to do programming. No aggressive or violent behavior or recent HX in the last 2 yrs. MH must be primary.) Always low acuity.    [facility] has 0 beds posted. Negative covid required.   Low acuity only. Violence and aggression capped.  [phone]    [facility] is posting 2 beds. Low acuity, Negative covid required. [phone]  Per call with Isi @9:16 AM only very low acuity beds open today    [facility] posting 4 beds. Negative covid required.  [phone] Declined earlier and needs an ICU level bed and none are available            [facility] is posting 5 beds. Negative covid. LOW acuity. (No lines, drains, or tubes, oxygen, CPAP, IV, etc.) Must Have a Ride Home. [phone]            [facility] is posting 2 beds. Negative covid. (No. lines, drains, or tubes, oxygen, CPAP, IV, etc.) [phone]            [facility] is posting 20 beds. No covid test required. [phone] 7:57 AM Per Dariana 22 adults, 10 peds.     Pt remains on the work list pending appropriate bed availability.

## 2025-01-02 NOTE — ED NOTES
Patient trying to be involved in codes in the hallway. Patient redirected to his room without effort.

## 2025-01-02 NOTE — ED NOTES
IP MH Referral Acuity Rating Score (RARS)    LMHP complete at referral to IP MH, with DEC; and, daily while awaiting IP MH placement. Call score to PPS.  CRITERIA SCORING   New 72 HH and Involuntary for IP MH (not adolescent) 1/1   Boarding over 24 hours 1/1   Vulnerable adult at least 55+ with multiple co morbidities; or, Patient age 11 or under 0/1   Suicide ideation without relief of precipitating factors 0/1   Current plan for suicide 0/1   Current plan for homicide 0/1   Imminent risk or actual attempt to seriously harm another without relief of factors precipitating the attempt 0/1   Severe dysfunction in daily living (ex: complete neglect for self care, extreme disruption in vegetative function, extreme deterioration in social interactions) 1/1   Recent (last 2 weeks) or current physical aggression in the ED 1/1   Restraints or seclusion episode in ED 0/1   Verbal aggression, agitation, yelling, etc., while in the ED 0/1   Active psychosis with psychomotor agitation or catatonia 1/1   Need for constant or near constant redirection (from leaving, from others, etc).  1/1   Intrusive or disruptive behaviors 1/1   TOTAL Acuity Total Score: 7

## 2025-01-02 NOTE — ED NOTES
Pt calm, and cooperative remaining in room throughout shift. Easily redirectable when needed, not requiring seclusion or restraint for over 24 hours.

## 2025-01-02 NOTE — ED NOTES
Patient wanted to know about getting a shower. Patient was moved from 14 to 10 after 24 hours of non aggressive behavior. Discussed this request with the charge RN. Patient informed that if he can have another 24 hours of good behavior then we can purse a shower. Patient has cleaned up in the bathroom here with supplies and changed clothes.   Patient agreeable to plan.

## 2025-01-02 NOTE — TELEPHONE ENCOUNTER
[place]: [place]  Access Inpatient Bed Call Log  1/2/25 @ 1:00am   Intake has called facilities that have not updated their bed status within the last 12 hours.     *METRO:  [place] -- [facility]: @ capacity.  [facility]: @ cap per website. Reporting no reviews overnight.  [place] -- [facility]: @ cap per website. Low acuity  [place] -- [facility]: @ cap per website. Low acuity only.  [place] -- [facility]: @ cap per website.  [facility]: @ cap per website.   [facility]/ beds: POSTING 4 BEDS. Not Reviewing tonight.  Ages 18-35, Voluntary only, NO aggression/physical/sexual assault, violence hx or drug abuse, or psychosis. Negative Covid   [place] -- [facility]: @ cap per website.   [place] -- [facility]: @ cap per website.  [place] -- [facility]: @ cap per website. Do not review overnight.     *STATEWIDE (by distance):  [facility]: POSTING 7 BEDS. Mixed unit. Ages 12 and up/Low acuity only.   [facility] - @ cap per website. Low acuity, No aggression.    [facility] - @ cap per website.   [facility] - @ cap per website. Low acuity only. No current aggression.  [facility] - @ cap per website. Negative Covid. Lower acuity only.   [facility] - @ cap per website. Low acuity only. Prefer med-adjustment placements.  [place] [facility] - @ cap per website. No aggression. - Only Low Acuity reviews.  [place] - [facility]: @ cap per website. No aggressive behaviors. Do not review overnight.  [place] -- [facility]: POSTING 4 BEDS.  No hx of aggression. No sexual offenders. Voluntary patients only.  [place] -- [facility]: POSTING 3 BEDS. Low acuity only. Must be able to do programming. No aggression/violent behavior in 2 years. No CD treatment.  [place] [facility]: @ cap per website. Negative Covid test. Must be low acuity ONLY.  [facility]: POSTING 2 BEDS. Low acuity. Negative Covid.    [place] -- [facility]: POSTING 4 LOW ACUITY BEDS.   [place] - [facility]: POSTING 5 BEDS. No hx of aggression/assault. No lines, drains or tubes. Does not provide detox or CD treatment. Require a confirmed ride upon discharge.  [place] -- [facility]: POSTING 2 BEDS. Negative COVID. No medical devices.     Pt remains on waitlist pending appropriate placement availability.

## 2025-01-03 ENCOUNTER — MEDICAL CORRESPONDENCE (OUTPATIENT)
Dept: HEALTH INFORMATION MANAGEMENT | Facility: CLINIC | Age: 25
End: 2025-01-03
Payer: COMMERCIAL

## 2025-01-03 PROCEDURE — 250N000013 HC RX MED GY IP 250 OP 250 PS 637: Performed by: PSYCHIATRY & NEUROLOGY

## 2025-01-03 PROCEDURE — 250N000013 HC RX MED GY IP 250 OP 250 PS 637: Performed by: FAMILY MEDICINE

## 2025-01-03 PROCEDURE — 250N000013 HC RX MED GY IP 250 OP 250 PS 637: Performed by: EMERGENCY MEDICINE

## 2025-01-03 RX ADMIN — NICOTINE POLACRILEX 2 MG: 2 GUM, CHEWING ORAL at 18:27

## 2025-01-03 RX ADMIN — LAMOTRIGINE 50 MG: 25 TABLET ORAL at 21:03

## 2025-01-03 RX ADMIN — NICOTINE POLACRILEX 2 MG: 2 GUM, CHEWING ORAL at 08:18

## 2025-01-03 RX ADMIN — NICOTINE POLACRILEX 2 MG: 2 GUM, CHEWING ORAL at 20:16

## 2025-01-03 RX ADMIN — NICOTINE POLACRILEX 2 MG: 2 GUM, CHEWING ORAL at 12:09

## 2025-01-03 RX ADMIN — NICOTINE POLACRILEX 2 MG: 2 GUM, CHEWING ORAL at 14:18

## 2025-01-03 RX ADMIN — HYDROXYZINE HYDROCHLORIDE 50 MG: 50 TABLET ORAL at 18:26

## 2025-01-03 RX ADMIN — OLANZAPINE 10 MG: 10 TABLET, ORALLY DISINTEGRATING ORAL at 20:16

## 2025-01-03 RX ADMIN — OLANZAPINE 10 MG: 10 TABLET, ORALLY DISINTEGRATING ORAL at 08:13

## 2025-01-03 RX ADMIN — NICOTINE POLACRILEX 2 MG: 2 GUM, CHEWING ORAL at 06:33

## 2025-01-03 RX ADMIN — NICOTINE 1 PATCH: 21 PATCH, EXTENDED RELEASE TRANSDERMAL at 08:14

## 2025-01-03 RX ADMIN — QUETIAPINE FUMARATE 400 MG: 100 TABLET ORAL at 21:03

## 2025-01-03 RX ADMIN — NICOTINE POLACRILEX 2 MG: 2 GUM, CHEWING ORAL at 09:56

## 2025-01-03 ASSESSMENT — ACTIVITIES OF DAILY LIVING (ADL)
ADLS_ACUITY_SCORE: 41
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
## 2025-01-03 NOTE — ED NOTES
Pts mom called, pt gave permission to share information about pt. Writer updated pts mom on plan of care - still waiting for bed placement, has not had any noted behaviors for past 24H.

## 2025-01-03 NOTE — TELEPHONE ENCOUNTER
R: [facility] Access Inpatient Bed Call Log  1/2/2025 4:53 PM  Intake has called facilities that have not updated their bed status within the last 12 hours.    *METRO:  [place] -- [facility]: @ Capacity.  [facility]: @ Capacity. Reporting no reviews overnight.  [place] -- [facility]: @ Capacity. Low acuity  [place] -- [facility]: @ Capacity. Low acuity only -5 PM Per Janie, they are closed due to covid.   [place] -- [facility]: @ Capacity.  [facility]: @ Capacity.  [facility] beds: Posting 4 beds. Ages 18-35, Voluntary only, NO aggression/physical/sexual assault, violence hx or drug abuse, or psychosis. Negative Covid (declined due to pt acuity)  [place] -- [facility]: @ Capacity.  [place] -- [facility]: @ Capacity.  [place] -- [facility]: @ Capacity. Do not review overnight.    *STATEWIDE (by distance):  [facility]: POSTING 7 BEDS. Mixed unit. Ages 12 and up/Low acuity only.   [facility] - Capacity Low acuity, No aggression.  [facility] - @ Capacity.  [facility] - @ Capacity. Low acuity only. No current aggression.  [facility] - Capacity. Negative Covid. Lower acuity only.   [facility] - Capacity. Low acuity only. Prefer med-adjustment placements.   [facility] - Capacity. No aggression. - Only Low Acuity reviews.    [place] - [facility]: Posting 1 beds. No aggressive behaviors. Do not review overnight.  [place] -- [facility]: Capacity.  No hx of aggression. No sexual offenders. Voluntary patients only.  [place] -- [facility]: Posting 3 beds. Low acuity only. Must be able to do programming. No aggression/violent behavior in 2 years. No CD treatment.  [place] [facility] @ Capacity. Negative Covid test. Must be low acuity ONLY.  [place] -- [facility]: Posting 2 beds. Low acuity. Negative Covid.   [place] -- [facility]: POSTING 4 BEDS. (Low acuity only at this time) (Declined due to needing MHICU bed)  [facility]: Posting 4 beds. No hx of aggression/assault. No lines, drains or tubes. Does not provide detox or CD treatment. Require a confirmed ride upon discharge.  [place] -- [facility]: POSTING 2 BEDS. Negative COVID. No medical devices.     Pt remains on waitlist pending appropriate placement availability.

## 2025-01-03 NOTE — ED NOTES
Writer spoke w/ pts PPS screener, Kimberly who states petition is supported and the court order will arrive by no later than 4pm on this date.         JENNIFER Jauregui  [facility]  [phone]

## 2025-01-03 NOTE — ED NOTES
LMHP contacted MP (PPS screener with [employer]) at [phone]. No answer, left  for call back.    LUDMILA Colindres, [facility]  DEC/Extended Care    P: [phone]  E: [email or web address]

## 2025-01-03 NOTE — ED NOTES
Court hold received, faxed to unit, HIM, and Central Intake.     France Loera, [facility]   Extended Care Coordinator   [phone]

## 2025-01-03 NOTE — ED NOTES
IP MH Referral Acuity Rating Score (RARS)    LMHP complete at referral to IP MH, with DEC; and, daily while awaiting IP MH placement. Call score to PPS.  CRITERIA SCORING   New 72 HH and Involuntary for IP MH (not adolescent) 1/1   Boarding over 24 hours 1/1   Vulnerable adult at least 55+ with multiple co morbidities; or, Patient age 11 or under 0/1   Suicide ideation without relief of precipitating factors 0/1   Current plan for suicide 0/1   Current plan for homicide 0/1   Imminent risk or actual attempt to seriously harm another without relief of factors precipitating the attempt 0/1   Severe dysfunction in daily living (ex: complete neglect for self care, extreme disruption in vegetative function, extreme deterioration in social interactions) 1/1   Recent (last 2 weeks) or current physical aggression in the ED 1/1   Restraints or seclusion episode in ED 0/1   Verbal aggression, agitation, yelling, etc., while in the ED 0/1   Active psychosis with psychomotor agitation or catatonia 0/1   Need for constant or near constant redirection (from leaving, from others, etc).  0/1   Intrusive or disruptive behaviors 0/1   TOTAL Acuity Total Score: 4

## 2025-01-04 ENCOUNTER — TELEPHONE (OUTPATIENT)
Dept: BEHAVIORAL HEALTH | Facility: CLINIC | Age: 25
End: 2025-01-04
Payer: COMMERCIAL

## 2025-01-04 ENCOUNTER — HOSPITAL ENCOUNTER (INPATIENT)
Facility: HOSPITAL | Age: 25
End: 2025-01-04
Attending: PSYCHIATRY & NEUROLOGY | Admitting: PSYCHIATRY & NEUROLOGY
Payer: COMMERCIAL

## 2025-01-04 VITALS
WEIGHT: 238.7 LBS | OXYGEN SATURATION: 96 % | HEIGHT: 72 IN | DIASTOLIC BLOOD PRESSURE: 88 MMHG | RESPIRATION RATE: 14 BRPM | BODY MASS INDEX: 32.33 KG/M2 | SYSTOLIC BLOOD PRESSURE: 137 MMHG | TEMPERATURE: 97.7 F | HEART RATE: 93 BPM

## 2025-01-04 DIAGNOSIS — F41.9 ANXIETY: ICD-10-CM

## 2025-01-04 DIAGNOSIS — F31.10 BIPOLAR I DISORDER WITH MANIA (H): Primary | ICD-10-CM

## 2025-01-04 DIAGNOSIS — F41.0 ANXIETY ATTACK: ICD-10-CM

## 2025-01-04 DIAGNOSIS — Z00.00 HEALTHCARE MAINTENANCE: ICD-10-CM

## 2025-01-04 LAB
FLUAV RNA SPEC QL NAA+PROBE: NEGATIVE
FLUBV RNA RESP QL NAA+PROBE: NEGATIVE
RSV RNA SPEC NAA+PROBE: NEGATIVE
SARS-COV-2 RNA RESP QL NAA+PROBE: NEGATIVE

## 2025-01-04 PROCEDURE — 250N000013 HC RX MED GY IP 250 OP 250 PS 637: Performed by: EMERGENCY MEDICINE

## 2025-01-04 PROCEDURE — 124N000001 HC R&B MH

## 2025-01-04 PROCEDURE — 250N000013 HC RX MED GY IP 250 OP 250 PS 637: Performed by: FAMILY MEDICINE

## 2025-01-04 PROCEDURE — 87637 SARSCOV2&INF A&B&RSV AMP PRB: CPT | Performed by: EMERGENCY MEDICINE

## 2025-01-04 PROCEDURE — 250N000013 HC RX MED GY IP 250 OP 250 PS 637: Performed by: NURSE PRACTITIONER

## 2025-01-04 RX ORDER — NICOTINE 21 MG/24HR
1 PATCH, TRANSDERMAL 24 HOURS TRANSDERMAL DAILY
Status: DISCONTINUED | OUTPATIENT
Start: 2025-01-05 | End: 2025-01-17 | Stop reason: HOSPADM

## 2025-01-04 RX ORDER — OLANZAPINE 10 MG/1
10 TABLET ORAL 2 TIMES DAILY PRN
Status: DISCONTINUED | OUTPATIENT
Start: 2025-01-04 | End: 2025-01-17 | Stop reason: HOSPADM

## 2025-01-04 RX ORDER — HYDROXYZINE HYDROCHLORIDE 25 MG/1
25 TABLET, FILM COATED ORAL EVERY 4 HOURS PRN
Status: DISCONTINUED | OUTPATIENT
Start: 2025-01-04 | End: 2025-01-04

## 2025-01-04 RX ORDER — OLANZAPINE 10 MG/2ML
10 INJECTION, POWDER, FOR SOLUTION INTRAMUSCULAR 2 TIMES DAILY PRN
Status: DISCONTINUED | OUTPATIENT
Start: 2025-01-04 | End: 2025-01-17 | Stop reason: HOSPADM

## 2025-01-04 RX ORDER — ACETAMINOPHEN 325 MG/1
650 TABLET ORAL EVERY 4 HOURS PRN
Status: DISCONTINUED | OUTPATIENT
Start: 2025-01-04 | End: 2025-01-17 | Stop reason: HOSPADM

## 2025-01-04 RX ORDER — MAGNESIUM HYDROXIDE/ALUMINUM HYDROXICE/SIMETHICONE 120; 1200; 1200 MG/30ML; MG/30ML; MG/30ML
30 SUSPENSION ORAL EVERY 4 HOURS PRN
Status: DISCONTINUED | OUTPATIENT
Start: 2025-01-04 | End: 2025-01-17 | Stop reason: HOSPADM

## 2025-01-04 RX ORDER — LAMOTRIGINE 25 MG/1
50 TABLET ORAL AT BEDTIME
Status: DISCONTINUED | OUTPATIENT
Start: 2025-01-04 | End: 2025-01-07

## 2025-01-04 RX ORDER — OLANZAPINE 10 MG/2ML
10 INJECTION, POWDER, FOR SOLUTION INTRAMUSCULAR 3 TIMES DAILY PRN
Status: DISCONTINUED | OUTPATIENT
Start: 2025-01-04 | End: 2025-01-04

## 2025-01-04 RX ORDER — OLANZAPINE 10 MG/1
10 TABLET, ORALLY DISINTEGRATING ORAL 2 TIMES DAILY
Status: DISCONTINUED | OUTPATIENT
Start: 2025-01-04 | End: 2025-01-17 | Stop reason: HOSPADM

## 2025-01-04 RX ORDER — OLANZAPINE 10 MG/1
10 TABLET ORAL 3 TIMES DAILY PRN
Status: DISCONTINUED | OUTPATIENT
Start: 2025-01-04 | End: 2025-01-04

## 2025-01-04 RX ORDER — HYDROXYZINE HYDROCHLORIDE 25 MG/1
50 TABLET, FILM COATED ORAL EVERY 4 HOURS PRN
Status: DISCONTINUED | OUTPATIENT
Start: 2025-01-04 | End: 2025-01-17 | Stop reason: HOSPADM

## 2025-01-04 RX ADMIN — NICOTINE POLACRILEX 2 MG: 2 GUM, CHEWING ORAL at 19:24

## 2025-01-04 RX ADMIN — LAMOTRIGINE 50 MG: 25 TABLET ORAL at 20:24

## 2025-01-04 RX ADMIN — NICOTINE POLACRILEX 2 MG: 2 GUM, CHEWING ORAL at 06:00

## 2025-01-04 RX ADMIN — OLANZAPINE 10 MG: 10 TABLET, ORALLY DISINTEGRATING ORAL at 20:24

## 2025-01-04 RX ADMIN — NICOTINE POLACRILEX 2 MG: 2 GUM, CHEWING ORAL at 09:01

## 2025-01-04 RX ADMIN — NICOTINE 1 PATCH: 21 PATCH, EXTENDED RELEASE TRANSDERMAL at 07:37

## 2025-01-04 RX ADMIN — HYDROXYZINE HYDROCHLORIDE 50 MG: 25 TABLET ORAL at 17:38

## 2025-01-04 RX ADMIN — OLANZAPINE 10 MG: 10 TABLET, ORALLY DISINTEGRATING ORAL at 07:38

## 2025-01-04 RX ADMIN — NICOTINE POLACRILEX 2 MG: 2 GUM, CHEWING ORAL at 11:50

## 2025-01-04 RX ADMIN — NICOTINE POLACRILEX 2 MG: 2 GUM, CHEWING ORAL at 07:38

## 2025-01-04 RX ADMIN — HYDROXYZINE HYDROCHLORIDE 50 MG: 50 TABLET ORAL at 09:01

## 2025-01-04 RX ADMIN — QUETIAPINE 400 MG: 100 TABLET ORAL at 20:24

## 2025-01-04 ASSESSMENT — ACTIVITIES OF DAILY LIVING (ADL)
ADLS_ACUITY_SCORE: 41
ADLS_ACUITY_SCORE: 15
ADLS_ACUITY_SCORE: 41
ADLS_ACUITY_SCORE: 15
ADLS_ACUITY_SCORE: 41
ADLS_ACUITY_SCORE: 15
ADLS_ACUITY_SCORE: 41
ADLS_ACUITY_SCORE: 41

## 2025-01-04 NOTE — PROGRESS NOTES
01/04/25 1613   Patient Belongings   Did you bring any home meds/supplements to the hospital?  No   Patient Belongings locker;sent to security per site process   Patient Belongings Put in Hospital Secure Location (Security or Locker, etc.) clothing;shoes;other (see comments)   Belongings Search Yes   Clothing Search Yes   Second Staff doron   Comment moccasins, socks, 2 pair jeans, 2 grey shirts, grey tank top, underwear, journal, tan sweater     List items sent to safe: none    All other belongings put in assigned cubby in belongings room.     ADDENDUM: 01/05/25 patients dad brought in a pair of grey slippers and some markers      I have reviewed my belongings list on admission and verify that it is correct.     Patient signature_______________________________    Second staff witness (if patient unable to sign) ______________________________       I have received all my belongings at discharge.    Patient signature________________________________    Lilo  1/4/2025  4:15 PM
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
## 2025-01-04 NOTE — TELEPHONE ENCOUNTER
4:45am - [facility] is reviewing. Awaiting update    6:30am - CRN reviewed pt and states they will put the pt's chart on hold for review due to ED notes needing updates on current behaviors, per pt's recent outbursts the day prior. If he is showing no seclusion/restraint/elopement behaviors for 48 hours [facility] can re-review for their MHICU bed later today. Writer will pass info along to ED as well as intake staff for next shift.       R: [place] [facility] Access Inpatient Bed Call Log  1/4/25 @ 1:00am   Intake has called facilities that have not updated their bed status within the last 12 hours.     *METRO:  [place] -- [facility]: @ capacity.  [facility]: POSTING 11 BEDS. Reporting no reviews overnight.  [place] -- [facility]: @ cap per website. Low acuity  [place] -- [facility]: @ cap per website. Low acuity only.  [place] -- [facility]: @ cap per website.  [facility]: @ cap per website.   [facility]/ beds: POSTING 6 BEDS. Ages 18-35, Voluntary only, NO aggression/physical/sexual assault, violence hx or drug abuse, or psychosis. Negative Covid   [place] -- [facility]: @ cap per website.   [place] -- [facility]: @ cap per website.  [place] -- [facility]: @ cap per website. Do not review overnight.     *STATEWIDE (by distance):  [facility]: POSTING 8 BEDS. Mixed unit. Ages 12 and up/Low acuity only.   [facility] - @ cap per website. Low acuity, No aggression.    [facility] - @ cap per website.   [facility] - POSTING 1 BED. Low acuity only. No current aggression.  [facility] - @ cap per website. Negative Covid. Lower acuity only.   [facility] - @ cap per website. Low acuity only. Prefer med-adjustment placements.  [facility] - POSTING 2 BEDS. No aggression. - Only Low Acuity reviews.  [place] - [facility]: POSTING 1 BED. No aggressive behaviors. Do not review overnight.  [place] --  [facility]: @ cap per website.  No hx of aggression. No sexual offenders. Voluntary patients only.  [place] -- [facility]: POSTING 3 BEDS. Low acuity only. Must be able to do programming. No aggression/violent behavior in 2 years. No CD treatment.  [place] [facility] @ cap per website. Negative Covid test. Must be low acuity ONLY.  [facility]: POSTING 2 BEDS. Low acuity. Negative Covid.    [place] -- [facility]: POSTING 9 BEDS.   [place] - [facility]: POSTING 4 BEDS. No hx of aggression/assault. No lines, drains or tubes. Does not provide detox or CD treatment. Require a confirmed ride upon discharge.  [place] -- [facility]: POSTING 2 BEDS. Negative COVID. No medical devices.     Pt remains on waitlist pending appropriate placement availability

## 2025-01-04 NOTE — TELEPHONE ENCOUNTER
8:56 AM Alfonso ELY provided MRN for review, awaiting callback.    10:58 AM Pt accepted to [address] [place] [address] by EVAN Rand. Nurse report anytime [phone].    11:05 AM Indicia complete.    11:06 AM [facility] ED notified. Pt added to admit board.    R: [place]  Access Inpatient Bed Call Log 1/4/25 @7:44 AM:   Intake has called facilities that have not updated the bed status within the last 12 hours.   STATEWIDE        (Adults):         [facility] is at capacity.              [facility] is posting 3 beds. [phone] Per call at 7:55am to [facility] at capacity.     [facility] is posting 0 beds. Negative covid required.     [facility] is posting 0 beds. Neg covid. No high school/Sue-psych. [phone] Per call at 7:55am to [place] low acuity beds available.     [facility] is posting 0 beds. [phone]     [facility] is posting 0 beds. [phone] Per call at 7:57am to [facility] no beds.     [facility] is posting 6 Young Adult beds. Negative covid. [phone]. Per call at 8:00am to [place] 2 child, 6 adol and 6 adult beds.     [facility] is posting 0 beds [phone].         [facility] is posting 8 beds. LOW acuity. Ages 12+. Neg covid- [phone] Per call at 7:57am to [facility] low acuity beds are available.     [facility] has 0 beds posted. No aggression. Negative Covid. Low acuity.     [facility] is posting 0 beds. Low acuity only. Neg covid.  [phone] Per call at 8:02am to [place] on BH diversion.     [facility] is posting 1 beds. Low acuity. No current aggression.      [facility] is posting 0 beds. Negative covid. [phone]     [facility] is posting 0 beds available. Negative covid.  [phone].         [facility] is posting 1 beds. Low acuity. 72 HH hold preferred. Sue unit. Negative covid required. [phone] Per call at 8:04am to [place] currently at capacity.     [facility]  is posting 2 beds. Low acuity only. Neg covid.  [phone]. Per call at 8:02am to [place] on BH diversion.     [facility] [place] is posting 5 beds.  Negative covid required.   Vol only, No history of aggression, violence, or assault. No sexual offenders. No 72 HH holds. [phone] PT NOT APPROPRIATE D/T LEGAL STATUS.        [facility] is posting 3 beds. Negative covid required.  (Must have the cognitive ability to do programming. No aggressive or violent behavior or recent HX in the last 2 yrs. MH must be primary.) Always low acuity.     [facility] has 0 beds posted. Negative covid required.  Low acuity only. Violence and aggression capped.  [phone]. Per call at 8:05am to [facility] no beds available.     [facility] is posting 2 beds. Low acuity, Negative covid required. [phone]. Per call at 8:06am to SYDNEE [facility] no beds for review.     [facility] posting 9 beds. Negative covid required.  [phone]              [facility] is posting 4 beds. Negative covid. LOW acuity. (No lines, drains, or tubes, oxygen, CPAP, IV, etc.) Must Have a Ride Home. [phone]. Per Call at 8:08am to [place] beds available.             [facility] is posting 2 beds. Negative covid. (No. lines, drains, or tubes, oxygen, CPAP, IV, etc.) [phone]. Per call at 8:09am to [place]ann[place] 2 on general and 2 high acuity beds (case by case).             [facility] is posting 28 beds. No covid test required. [phone]. Per call at 8:11am to Dariana 10 kids/adol and adults 16 beds available. PT NOT APPROPRIATE D/T LEGAL STATUS.      Pt remains on the work list pending appropriate bed availability.

## 2025-01-05 LAB
ALBUMIN SERPL BCG-MCNC: 4.6 G/DL (ref 3.5–5.2)
ALP SERPL-CCNC: 76 U/L (ref 40–150)
ALT SERPL W P-5'-P-CCNC: 48 U/L (ref 0–70)
ANION GAP SERPL CALCULATED.3IONS-SCNC: 11 MMOL/L (ref 7–15)
AST SERPL W P-5'-P-CCNC: 31 U/L (ref 0–45)
BASOPHILS # BLD AUTO: 0.1 10E3/UL (ref 0–0.2)
BASOPHILS NFR BLD AUTO: 1 %
BILIRUB SERPL-MCNC: 0.4 MG/DL
BUN SERPL-MCNC: 12.5 MG/DL (ref 6–20)
CALCIUM SERPL-MCNC: 9.5 MG/DL (ref 8.8–10.4)
CHLORIDE SERPL-SCNC: 104 MMOL/L (ref 98–107)
CREAT SERPL-MCNC: 0.9 MG/DL (ref 0.67–1.17)
EGFRCR SERPLBLD CKD-EPI 2021: >90 ML/MIN/1.73M2
EOSINOPHIL # BLD AUTO: 0.1 10E3/UL (ref 0–0.7)
EOSINOPHIL NFR BLD AUTO: 2 %
ERYTHROCYTE [DISTWIDTH] IN BLOOD BY AUTOMATED COUNT: 11.3 % (ref 10–15)
GLUCOSE SERPL-MCNC: 102 MG/DL (ref 70–99)
HCO3 SERPL-SCNC: 23 MMOL/L (ref 22–29)
HCT VFR BLD AUTO: 44.7 % (ref 40–53)
HGB BLD-MCNC: 16.3 G/DL (ref 13.3–17.7)
IMM GRANULOCYTES # BLD: 0 10E3/UL
IMM GRANULOCYTES NFR BLD: 0 %
LYMPHOCYTES # BLD AUTO: 1.3 10E3/UL (ref 0.8–5.3)
LYMPHOCYTES NFR BLD AUTO: 23 %
MCH RBC QN AUTO: 32.1 PG (ref 26.5–33)
MCHC RBC AUTO-ENTMCNC: 36.5 G/DL (ref 31.5–36.5)
MCV RBC AUTO: 88 FL (ref 78–100)
MONOCYTES # BLD AUTO: 0.4 10E3/UL (ref 0–1.3)
MONOCYTES NFR BLD AUTO: 7 %
NEUTROPHILS # BLD AUTO: 3.7 10E3/UL (ref 1.6–8.3)
NEUTROPHILS NFR BLD AUTO: 66 %
NRBC # BLD AUTO: 0 10E3/UL
NRBC BLD AUTO-RTO: 0 /100
PLATELET # BLD AUTO: 241 10E3/UL (ref 150–450)
POTASSIUM SERPL-SCNC: 4.3 MMOL/L (ref 3.4–5.3)
PROT SERPL-MCNC: 7.4 G/DL (ref 6.4–8.3)
RBC # BLD AUTO: 5.07 10E6/UL (ref 4.4–5.9)
SODIUM SERPL-SCNC: 138 MMOL/L (ref 135–145)
VIT D+METAB SERPL-MCNC: 24 NG/ML (ref 20–50)
WBC # BLD AUTO: 5.6 10E3/UL (ref 4–11)

## 2025-01-05 PROCEDURE — 250N000013 HC RX MED GY IP 250 OP 250 PS 637: Performed by: NURSE PRACTITIONER

## 2025-01-05 PROCEDURE — 250N000013 HC RX MED GY IP 250 OP 250 PS 637

## 2025-01-05 PROCEDURE — 36415 COLL VENOUS BLD VENIPUNCTURE: CPT

## 2025-01-05 PROCEDURE — 124N000001 HC R&B MH

## 2025-01-05 PROCEDURE — 80051 ELECTROLYTE PANEL: CPT

## 2025-01-05 PROCEDURE — 85025 COMPLETE CBC W/AUTO DIFF WBC: CPT

## 2025-01-05 PROCEDURE — 99223 1ST HOSP IP/OBS HIGH 75: CPT | Mod: AI

## 2025-01-05 PROCEDURE — 82306 VITAMIN D 25 HYDROXY: CPT

## 2025-01-05 RX ORDER — CHLORAL HYDRATE 500 MG
1 CAPSULE ORAL DAILY
Status: DISCONTINUED | OUTPATIENT
Start: 2025-01-05 | End: 2025-01-17 | Stop reason: HOSPADM

## 2025-01-05 RX ORDER — MULTIPLE VITAMINS W/ MINERALS TAB 9MG-400MCG
1 TAB ORAL DAILY
Status: DISCONTINUED | OUTPATIENT
Start: 2025-01-05 | End: 2025-01-17 | Stop reason: HOSPADM

## 2025-01-05 RX ADMIN — OMEGA-3 FATTY ACIDS CAP 1000 MG 1 G: 1000 CAP at 10:47

## 2025-01-05 RX ADMIN — NICOTINE POLACRILEX 2 MG: 2 GUM, CHEWING ORAL at 21:28

## 2025-01-05 RX ADMIN — OLANZAPINE 10 MG: 10 TABLET, ORALLY DISINTEGRATING ORAL at 20:25

## 2025-01-05 RX ADMIN — HYDROXYZINE HYDROCHLORIDE 50 MG: 25 TABLET ORAL at 17:15

## 2025-01-05 RX ADMIN — NICOTINE POLACRILEX 2 MG: 2 GUM, CHEWING ORAL at 10:47

## 2025-01-05 RX ADMIN — Medication 1 TABLET: at 10:47

## 2025-01-05 RX ADMIN — QUETIAPINE 400 MG: 100 TABLET ORAL at 20:25

## 2025-01-05 RX ADMIN — Medication 1 PATCH: at 08:01

## 2025-01-05 RX ADMIN — NICOTINE POLACRILEX 2 MG: 2 GUM, CHEWING ORAL at 13:50

## 2025-01-05 RX ADMIN — NICOTINE POLACRILEX 2 MG: 2 GUM, CHEWING ORAL at 08:39

## 2025-01-05 RX ADMIN — LAMOTRIGINE 50 MG: 25 TABLET ORAL at 20:25

## 2025-01-05 RX ADMIN — OLANZAPINE 10 MG: 10 TABLET, ORALLY DISINTEGRATING ORAL at 08:01

## 2025-01-05 RX ADMIN — NICOTINE POLACRILEX 2 MG: 2 GUM, CHEWING ORAL at 15:44

## 2025-01-05 ASSESSMENT — ACTIVITIES OF DAILY LIVING (ADL)
ADLS_ACUITY_SCORE: 15
HYGIENE/GROOMING: INDEPENDENT
ADLS_ACUITY_SCORE: 15
ADLS_ACUITY_SCORE: 15
DRESS: INDEPENDENT;SCRUBS (BEHAVIORAL HEALTH)
ADLS_ACUITY_SCORE: 15
ORAL_HYGIENE: INDEPENDENT
ADLS_ACUITY_SCORE: 15
LAUNDRY: UNABLE TO COMPLETE
ADLS_ACUITY_SCORE: 15

## 2025-01-05 NOTE — PLAN OF CARE
"Shift note 7am-1130pm    Face to face end of shift report received from Mariza HUDSON RN.  Pt observed in [facility].      Problem: Adult Behavioral Health Plan of Care  Goal: Patient-Specific Goal (Individualization)  Description: Patient will eat >75% of meals.   Patient will maintain ADLs without prompting.   Patient will attend >50% of groups when able.     1/4/25 Weaning Care Plan: Patient unable to wean due to elopement behaviors in ED. Treatment team to reassess daily.   Outcome: Progressing     Problem: Thought Process Alteration  Goal: Optimal Thought Clarity  Description: Patient will be able have a reality based conversation by discharge.   Patient will be free of paranoia by discharge.  Patient will sleep >6 hours of sleep per night.   Outcome: Progressing   Goal Outcome Evaluation:       Pt is pleasant and cooperative with writer.  He states \" I am doing a lot better.  I can't wait to get out of here.\"  Pt admits to some anxiety.  Denies SI, HI, pain and depression.  Pt asked for a bible and has been reading it the lounge most of the morning.  Pt has been weaning to the open unit and has done well with this.  Pt as been attending group therapy.  Pt's father visited this afternoon which went well.  1715- Pt requested PRN atarax for anxiety and later reported PRN effective. Pt cooperative with scheduled medications.  Makes needs known.     Face to face end of shift report communicated to oncoming RN.     Viviane Leung RN                      "
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
## 2025-01-05 NOTE — PLAN OF CARE
Problem: Adult Behavioral Health Plan of Care  Goal: Patient-Specific Goal (Individualization)  Description: Patient will eat >75% of meals.   Patient will maintain ADLs without prompting.   Patient will attend >50% of groups when able.     1/4/25 Weaning Care Plan: Patient unable to wean due to elopement behaviors in ED. Treatment team to reassess daily.   Outcome: Progressing  Note: Report received from Gabriella. Rounding complete. Pt observed sleeping in right side lying position with regular and unlabored respirations.    0015- Pt requested and was provided a snack of a PB&J and a salami/cheese sandwich, ice water, and a bag of chips. Pt denied having any questions or concerns and denied criteria at this time. He was pleasant and cooperative at interactions and easily able to make his needs known.     Pt has been in bed with eyes closed and regular respirations. 15 minute and PRN checks all night. No complaints offered.     Pt slept approx  4.25  hours this NOC shift.    Face to face end of shift report communicated to oncoming RN.    Mariza MEADE RN  January 5, 2025  12:13 AM          Problem: Thought Process Alteration  Goal: Optimal Thought Clarity  Description: Patient will be able have a reality based conversation by discharge.   Patient will be free of paranoia by discharge.  Patient will sleep >6 hours of sleep per night.   Outcome: Progressing  Note: Pt does not appear to be responding to internal stimuli. Speech pace, rate, and variance is WNL. He is able to make his needs known. He has not made any paranoid statements to this writer as of now this NOC shift.    Goal Outcome Evaluation:

## 2025-01-06 PROCEDURE — 250N000013 HC RX MED GY IP 250 OP 250 PS 637: Performed by: NURSE PRACTITIONER

## 2025-01-06 PROCEDURE — 250N000013 HC RX MED GY IP 250 OP 250 PS 637

## 2025-01-06 PROCEDURE — 99232 SBSQ HOSP IP/OBS MODERATE 35: CPT | Performed by: NURSE PRACTITIONER

## 2025-01-06 PROCEDURE — 124N000001 HC R&B MH

## 2025-01-06 RX ORDER — POLYETHYLENE GLYCOL 3350 17 G
2 POWDER IN PACKET (EA) ORAL
Status: DISCONTINUED | OUTPATIENT
Start: 2025-01-06 | End: 2025-01-17 | Stop reason: HOSPADM

## 2025-01-06 RX ORDER — CREATINE 100 %
1 POWDER (GRAM) MISCELLANEOUS DAILY
COMMUNITY

## 2025-01-06 RX ADMIN — LAMOTRIGINE 50 MG: 25 TABLET ORAL at 22:06

## 2025-01-06 RX ADMIN — NICOTINE POLACRILEX 2 MG: 2 GUM, CHEWING ORAL at 15:17

## 2025-01-06 RX ADMIN — OLANZAPINE 10 MG: 10 TABLET, ORALLY DISINTEGRATING ORAL at 08:10

## 2025-01-06 RX ADMIN — OLANZAPINE 10 MG: 10 TABLET, ORALLY DISINTEGRATING ORAL at 22:06

## 2025-01-06 RX ADMIN — HYDROXYZINE HYDROCHLORIDE 50 MG: 25 TABLET ORAL at 15:42

## 2025-01-06 RX ADMIN — Medication 1 TABLET: at 08:10

## 2025-01-06 RX ADMIN — NICOTINE POLACRILEX 2 MG: 2 LOZENGE ORAL at 18:56

## 2025-01-06 RX ADMIN — QUETIAPINE 400 MG: 100 TABLET ORAL at 22:06

## 2025-01-06 RX ADMIN — HYDROXYZINE HYDROCHLORIDE 50 MG: 25 TABLET ORAL at 04:49

## 2025-01-06 RX ADMIN — OMEGA-3 FATTY ACIDS CAP 1000 MG 1 G: 1000 CAP at 08:10

## 2025-01-06 RX ADMIN — NICOTINE POLACRILEX 2 MG: 2 GUM, CHEWING ORAL at 08:21

## 2025-01-06 RX ADMIN — NICOTINE POLACRILEX 2 MG: 2 LOZENGE ORAL at 17:35

## 2025-01-06 ASSESSMENT — ACTIVITIES OF DAILY LIVING (ADL)
ADLS_ACUITY_SCORE: 15
LAUNDRY: UNABLE TO COMPLETE
ADLS_ACUITY_SCORE: 15
HYGIENE/GROOMING: INDEPENDENT
ADLS_ACUITY_SCORE: 15
ORAL_HYGIENE: INDEPENDENT
ADLS_ACUITY_SCORE: 15
DRESS: INDEPENDENT;SCRUBS (BEHAVIORAL HEALTH)
ADLS_ACUITY_SCORE: 15
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
## 2025-01-06 NOTE — PLAN OF CARE
"  Problem: Adult Behavioral Health Plan of Care  Goal: Patient-Specific Goal (Individualization)  Description: Patient will eat >75% of meals.   Patient will maintain ADLs without prompting.   Patient will attend >50% of groups when able.     1/5/25- Pt may wean as appropriate.   Outcome: Progressing  Note: Report received from Viviane. Rounding complete. Pt observed sleeping in supine position with regular and unlabored respirations.    0115- Pt observed top be awake again. Pt offered but declined melatonin or anything else to assist with sleep. Pt did request and was provided water. He will only drink bottled water poured into a cup due to stating the water from the machine in the lounge tastes too metallic. Pt stated he wants to trust the provider fully, so he's \"going to turn water into wine.\" Pt again offered a PRN. He declined.    0449- Pt offered and accepted 50 mg hydroxyzine per med order. Pt asked if he should have IM Zyprexa but denied all criteria that it is ordered for at this time. Pt does not appear to be responding to internal stimuli, is pleasant and cooperative, low anxiety, and is able to make his needs known without issue. Pt was advised that if for some reason he begins to feel any agitation, irritability, experiencing hallucinations, or generally feels like he is cycling backwards and needs the zyprexa to let us know before it becomes bad. Pt did again ask that writer request a probiotic for him. Sticky note sent.     5104- Pt c/o sinus congestion. Just had hydroxyzine under 1 hour ago and this should work as an antihistamine as well. Sticky note sent to provider.      15 minute and PRN checks all night. No complaints offered.     Pt slept approx  4  hours this NOC shift.    Face to face end of shift report communicated to oncoming SYDNEE.    Mariza MEADE RN  January 6, 2025  1:02 AM          Problem: Thought Process Alteration  Goal: Optimal Thought Clarity  Description: Patient will be able have a " reality based conversation by discharge.   Patient will be free of paranoia by discharge.  Patient will sleep >6 hours of sleep per night.   Outcome: Progressing  Note: Pt is able to make his needs known. He was awake briefly at the beginning of the shift but denied any needs or complaints at that time.    Goal Outcome Evaluation:

## 2025-01-06 NOTE — PLAN OF CARE
"Face to face shift report received from previous shift RN.       Problem: Adult Behavioral Health Plan of Care  Goal: Patient-Specific Goal (Individualization)  Description: Patient will eat >75% of meals.   Patient will maintain ADLs without prompting.   Patient will attend >50% of groups when able.   Outcome: Progressing  Calm and cooperative. Denies thoughts of harming self or others. Requested Hydroxyzine 50 mg at 1542, stating \"I'm more fired up than I want to be.\" On reassessment pt reported little effect. Spends majority of shift in lounge area. Social and appropriate with others. Attends groups. No reports of pain.     Problem: Thought Process Alteration  Goal: Optimal Thought Clarity  Description: Patient will be able have a reality based conversation by discharge.   Patient will be free of paranoia by discharge.  Patient will sleep >6 hours of sleep per night.   Outcome: Progressing      Face to face end of shift report to be communicated to oncoming RN.       "

## 2025-01-06 NOTE — PLAN OF CARE
Face to face end of shift report received from Mariza HUDSON RN.  Pt observed awake in MHICU.        Problem: Adult Behavioral Health Plan of Care  Goal: Patient-Specific Goal (Individualization)  Description: Patient will eat >75% of meals.   Patient will maintain ADLs without prompting.   Patient will attend >50% of groups when able.     1/5/25- Pt may wean as appropriate.   Outcome: Progressing     Problem: Thought Process Alteration  Goal: Optimal Thought Clarity  Description: Patient will be able have a reality based conversation by discharge.   Patient will be free of paranoia by discharge.  Patient will sleep >6 hours of sleep per night.   Outcome: Progressing   Goal Outcome Evaluation:          Pt is pleasant and cooperative with writer.    Pt admits to some anxiety.  Denies SI, HI, pain and depression.  Pt has been weaning to the open unit and has done well with this. Pt was later moved to the open unit.  Pt as been attending group therapy.  Pt cooperative with scheduled medications.  Makes needs known.       Face to face end of shift report communicated to oncoming RN.     Viviane Leung RN  1/6/2025
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
## 2025-01-06 NOTE — MEDICATION SCRIBE - ADMISSION MEDICATION HISTORY
Medication Scribe Admission Medication History    Admission medication history is complete. The information provided in this note is only as accurate as the sources available at the time of the update.    Information Source(s): Patient and CareEverywhere/SureScripts via in-person    Pertinent Information:   Patient manages his own medications- reliable historian. Transferred from ER where he was boarding since 12/28/24 and medication changes were made (wellbutrin stopped, seroquel increased, meds initiated).     Changes made to PTA medication list:  Added: mag, ag1, vit D+ vit K, fish oil, creatine  Deleted: None  Changed: seroquel updated from 12.5 mg daily to 25-50 mg at bedtime PRN per Rx, pt confirmed taking PRN- makes him tired    Allergies reviewed with patient and updates made in EHR: yes    Medication History Completed By: Tonja Yip 1/6/2025 10:30 AM    PTA Med List   Medication Sig Note Last Dose/Taking    buPROPion (WELLBUTRIN XL) 150 MG 24 hr tablet Take 1 tablet by mouth every morning. 1/6/2025: Discontinued at transferring facility.  Past Month    Creatine POWD Take 1 teaspoonful by mouth daily.  Past Month    lamoTRIgine (LAMICTAL) 100 MG tablet Take 0.5 tablets by mouth at bedtime.  1/3/2025    MAGNESIUM PO Take 4 capsules by mouth daily. (L-threonate)  Past Month    Omega-3 Fatty Acids (FISH OIL PO) Take 2 capsules by mouth daily.  Past Month    OVER-THE-COUNTER Take 1 Tablespoonful by mouth daily. AG1: greens + probiotic  Past Month    QUEtiapine (SEROQUEL) 25 MG tablet Take 25-50 mg by mouth nightly as needed. 1/6/2025: Increased at transferring facility to 400 mg at bedtime  Past Week    Vitamin D-Vitamin K (VITAMIN K2-VITAMIN D3 PO) Take 1 drop by mouth daily.  Past Week

## 2025-01-07 PROCEDURE — 250N000013 HC RX MED GY IP 250 OP 250 PS 637: Performed by: NURSE PRACTITIONER

## 2025-01-07 PROCEDURE — 99232 SBSQ HOSP IP/OBS MODERATE 35: CPT | Performed by: NURSE PRACTITIONER

## 2025-01-07 PROCEDURE — 250N000013 HC RX MED GY IP 250 OP 250 PS 637

## 2025-01-07 PROCEDURE — 124N000001 HC R&B MH

## 2025-01-07 RX ORDER — LAMOTRIGINE 25 MG/1
50 TABLET ORAL 2 TIMES DAILY
Status: DISCONTINUED | OUTPATIENT
Start: 2025-01-07 | End: 2025-01-17 | Stop reason: HOSPADM

## 2025-01-07 RX ADMIN — NICOTINE POLACRILEX 2 MG: 2 LOZENGE ORAL at 10:38

## 2025-01-07 RX ADMIN — NICOTINE POLACRILEX 2 MG: 2 LOZENGE ORAL at 19:36

## 2025-01-07 RX ADMIN — HYDROXYZINE HYDROCHLORIDE 50 MG: 25 TABLET ORAL at 13:59

## 2025-01-07 RX ADMIN — OLANZAPINE 10 MG: 10 TABLET, ORALLY DISINTEGRATING ORAL at 21:03

## 2025-01-07 RX ADMIN — LAMOTRIGINE 50 MG: 25 TABLET ORAL at 21:03

## 2025-01-07 RX ADMIN — NICOTINE POLACRILEX 2 MG: 2 LOZENGE ORAL at 17:14

## 2025-01-07 RX ADMIN — OMEGA-3 FATTY ACIDS CAP 1000 MG 1 G: 1000 CAP at 08:01

## 2025-01-07 RX ADMIN — NICOTINE POLACRILEX 2 MG: 2 LOZENGE ORAL at 11:53

## 2025-01-07 RX ADMIN — QUETIAPINE 400 MG: 100 TABLET ORAL at 21:03

## 2025-01-07 RX ADMIN — NICOTINE POLACRILEX 2 MG: 2 LOZENGE ORAL at 08:56

## 2025-01-07 RX ADMIN — NICOTINE POLACRILEX 2 MG: 2 LOZENGE ORAL at 15:16

## 2025-01-07 RX ADMIN — NICOTINE POLACRILEX 2 MG: 2 LOZENGE ORAL at 06:37

## 2025-01-07 RX ADMIN — HYDROXYZINE HYDROCHLORIDE 50 MG: 25 TABLET ORAL at 18:15

## 2025-01-07 RX ADMIN — NICOTINE POLACRILEX 2 MG: 2 LOZENGE ORAL at 13:59

## 2025-01-07 RX ADMIN — Medication 1 TABLET: at 08:01

## 2025-01-07 RX ADMIN — NICOTINE POLACRILEX 2 MG: 2 LOZENGE ORAL at 07:56

## 2025-01-07 RX ADMIN — LAMOTRIGINE 50 MG: 25 TABLET ORAL at 11:06

## 2025-01-07 RX ADMIN — OLANZAPINE 10 MG: 10 TABLET, ORALLY DISINTEGRATING ORAL at 08:01

## 2025-01-07 RX ADMIN — Medication 1 PATCH: at 08:00

## 2025-01-07 ASSESSMENT — ACTIVITIES OF DAILY LIVING (ADL)
ADLS_ACUITY_SCORE: 20
ADLS_ACUITY_SCORE: 15
ADLS_ACUITY_SCORE: 20
ADLS_ACUITY_SCORE: 20
DRESS: SCRUBS (BEHAVIORAL HEALTH);INDEPENDENT
ADLS_ACUITY_SCORE: 15
ORAL_HYGIENE: INDEPENDENT
ADLS_ACUITY_SCORE: 15
ADLS_ACUITY_SCORE: 15
ADLS_ACUITY_SCORE: 20
HYGIENE/GROOMING: INDEPENDENT
DRESS: INDEPENDENT;SCRUBS (BEHAVIORAL HEALTH)
ADLS_ACUITY_SCORE: 20
ORAL_HYGIENE: INDEPENDENT
ADLS_ACUITY_SCORE: 15
ADLS_ACUITY_SCORE: 20
ADLS_ACUITY_SCORE: 15
ADLS_ACUITY_SCORE: 20
LAUNDRY: UNABLE TO COMPLETE
ADLS_ACUITY_SCORE: 15
ADLS_ACUITY_SCORE: 20
ADLS_ACUITY_SCORE: 15
HYGIENE/GROOMING: INDEPENDENT

## 2025-01-07 NOTE — PLAN OF CARE
Problem: Adult Behavioral Health Plan of Care  Goal: Patient-Specific Goal (Individualization)  Description: Patient will eat >75% of meals.   Patient will maintain ADLs without prompting.   Patient will attend >50% of groups when able.     Outcome: Progressing     Problem: Thought Process Alteration  Goal: Optimal Thought Clarity  Description: Patient will be able have a reality based conversation by discharge.   Patient will be free of paranoia by discharge.  Patient will sleep >6 hours of sleep per night.   Outcome: Progressing   Goal Outcome Evaluation:     Plan of Care Reviewed With: patient         Pt. Has been up and about on unit, slept 6 hours last night, is independent with ADL's, appetite is good, eating at least 50% of meals, is able to make needs be known, taking prescribed medications, cooperative with treatment team recommendations, is social with peers, endorses anxiety, denies depression and suicidal ideation, has reality based conversation, will continue to monitor progress.  1349-  Pt. Requested/received atarax 50 mg po for anxiety.      Face to face end of shift report will be communicated to oncoming afternoon shift RN.     Mariza Darnell RN  1/7/2025  10:52 AM

## 2025-01-07 NOTE — PROGRESS NOTES
[facility] PSYCHIATRY  PROGRESS NOTE     SUBJECTIVE     Prior to interviewing the patient, I met with nursing and reviewed patient's clinical condition. We discussed clinical care both before and after the interview. I have reviewed the patient's clinical course by review of records including previous notes, labs, and vital signs.     Per nursing, the patient had the following behavioral events over the last 24-hours: Calm and cooperative. Denies thoughts of harming self or others. Slept 6 hours.     On psychiatric interview, [place]. Has transitioned without issues to the open unit. Having a private area to get away from stimulation has been helpful with controlling stress and emotions. He reports sleeping well, is attending most groups. He is reading self help books and doing some bible study.     He admits to an anger issue which is what he is trying to work on with the books.  We discuss his behaviors which lead to him going to the emergency department, specifically his relationship with his mother and potential boundary issues they may have. He acknowledges some dependence on her and she always is there to help him. He states he does have some animosity towards her and is unsure where this is coming from. We discussed different treatment options including individual therapy, DBT while taking  medications can help while he goes through this process. Advised he would likely be able to reduce them in the future after he's learned more skills and understands his emotional responses and what he can do differently in the future. Discussed adding on lamotrigine in the morning for a more consistent dosing as he's been only taking it at night. He is aware of the B/R/SE as he's been on it since 2021, we reviewed and he's agreeable to this additional dose.     He questions if the poor oxygen levels in the ED could have made him behave poorly (he sells HVAC systems) and advised that it would not as  it would affect everyone around him as well which it did not. Advised it was an emotional reaction. Reiterated that he can change his behaviors and learn from this experience. He verbalized understanding and is appreciative.      MEDICATIONS   Scheduled Meds:  Current Facility-Administered Medications   Medication Dose Route Frequency Provider Last Rate Last Admin    fish oil-omega-3 fatty acids capsule 1 g  1 g Oral Daily Tariq Rand APRN CNP   1 g at 01/07/25 0801    lamoTRIgine (LaMICtal) tablet 50 mg  50 mg Oral At Bedtime Dariana Daley NP   50 mg at 01/06/25 2206    multivitamin w/minerals (THERA-VIT-M) tablet 1 tablet  1 tablet Oral Daily Tariq Rand APRN CNP   1 tablet at 01/07/25 0801    nicotine (NICODERM CQ) 21 MG/24HR 24 hr patch 1 patch  1 patch Transdermal Daily Dariana Daley NP   1 patch at 01/07/25 0800    OLANZapine zydis (zyPREXA) ODT tab 10 mg  10 mg Oral BID Dariana Daley NP   10 mg at 01/07/25 0801    QUEtiapine (SEROquel) tablet 400 mg  400 mg Oral At Bedtime Dariana Daley NP   400 mg at 01/06/25 2206     PRN Meds:.  Current Facility-Administered Medications   Medication Dose Route Frequency Provider Last Rate Last Admin    acetaminophen (TYLENOL) tablet 650 mg  650 mg Oral Q4H PRN Dariana Daley NP        alum & mag hydroxide-simethicone (MAALOX) suspension 30 mL  30 mL Oral Q4H PRN Dariana Daley NP        hydrOXYzine HCl (ATARAX) tablet 50 mg  50 mg Oral Q4H PRN Dariana Daley NP   50 mg at 01/06/25 1542    melatonin tablet 3 mg  3 mg Oral At Bedtime PRN Dariana Daley NP        nicotine (COMMIT) lozenge 2 mg  2 mg Buccal Q1H PRN Mel Greene APRN CNP   2 mg at 01/07/25 0856    OLANZapine (zyPREXA) tablet 10 mg  10 mg Oral BID PRN Dariana Daley NP        Or    OLANZapine (zyPREXA) injection 10 mg  10 mg Intramuscular BID PRN Dariana Daley NP            ALLERGIES   No Known Allergies     MENTAL STATUS EXAM   Vitals: /64   Pulse 88    "Temp 97.6  F (36.4  C) (Temporal)   Resp 18   Ht 1.829 m (6')   Wt 106.6 kg (235 lb)   SpO2 98%   BMI 31.87 kg/m      Appearance: Alert, oriented, dressed in hospital scrubs  Attitude: Cooperative  Eye Contact: Fair  Mood: \"doing good\", \"getting better\"  Affect: Blunted, reduced range   Speech: Normal rate and rhythm   Psychomotor Behavior: No TD or rigidity. No tremor or akathisia.   Thought Process:  Linear conversation  Associations: No loose associations   Thought Content: Denies SI, plan, or SIB. Denies AVH. No evidence of delusional thoughts.   Insight: Improving  Judgment: Improving  Oriented to: Person, place, and time  Attention Span and Concentration: Intact  Recent and Remote Memory: Intact  Language: English with appropriate syntax and vocabulary  Fund of Knowledge: Average   Muscle Strength and Tone: Grossly normal  Gait and Station: Grossly normal       LABS   No results found for this or any previous visit (from the past 24 hours).        IMPRESSION     This is a 24 year old male with a PMH of bipolar I disorder and factitious disorder with previous hospitalizations in a similar state who presents in apparent mixed episode with psychotic features with SI. Reportedly had not slept for 3 or more days PTA and was more agitated and irritable. This could be influenced by possible medication non-compliance, subtherapeutic doses or psychosocial stressors. Likely is multifactorial. Pt did report medication compliance. It does not appear that substances are influencing presentation with negative UDS and pt report of abstinence from such substances for the last several years. Sx related to bipolar have clearly caused disruption in multiple social spheres where inpatient stabilization is recommended for further safety and stabilization. Petition for commitment through [facility] Awaiting dates.      On arrival, pt leigh ann and psychosis appears to be softening with " the introduction of scheduled Seroquel and Zyprexa. Pt does continue to have Restorationist preoccupations and grandiose delusions. Per ED chart, it was recommended Zyprexa be added for a brief period to aid in reduction on sx. Lamictal will likely need to be titrated. Ordered a CMP, CBC and Vit D with the CMP and CBC unremarkable. Vit D is pending. Added fish oil and MVI per pt request with fish oil adding potential benefit in leigh ann.     Today: Linear conversation, is able to problem solve, accept education regarding his mental health and impact that the social situations can have on him. Will add morning dose of lamotrigine on for mood stabilization.          DIAGNOSES     #. Bipolar I disorder, current episode mixed with psychotic features  #. Factitious disorder by hx  #. R/O medication non-compliance       PLAN     Location: Unit 5  Legal Status: Orders Placed This Encounter      Court Hold    Safety Assessment:    Behavioral Orders   Procedures    Code 1 - Restrict to Unit    Routine Programming     As clinically indicated    Status 15     Every 15 minutes.      PTA psychotropic medications held:      - Wellbutrin 150 mg daily     PTA psychotropic medications continued/changed:      - Lamictal 50 mg at bedtime -> BID dosing 1/7/2025  - Seroquel 400 mg at bedtime  - Zyprexa 10 mg BID     New medications initiated:      - Standard unit PRNs  - Fish oil 1 g daily    Today's Changes:    - Lamotrigine 50 mg BID    Programming: Patient will be treated in a therapeutic milieu with appropriate individual and group therapies. Education will be provided on diagnoses, medications, and treatments.     Medical diagnoses:  Per medicine    Consult: None  Tests: CBC, CMP unremarkable. Vit D pending     Anticipated LOS: >5 days  Disposition: home with OP psychiatry services, TBD with civil commitment        TREATMENT TEAM CARE PLAN     Progress: Continued symptoms.    Continued Stay Criteria/Rationale: Continued symptoms without  sufficient improvement/resolution.    Medical/Physical: See above.    Precautions: See above.     Plan: Continue inpatient care with unit support and medication management.    Rationale for change in precautions or plan: NA due to no change.    Participants: KRUNAL Rossi CNP, Nursing, SW, OT.    The patient's care was discussed with the treatment team and chart notes were reviewed.       ATTESTATION      KRUNAL Hansen, PMHNP-BC, FNP-C

## 2025-01-07 NOTE — PLAN OF CARE
Problem: Adult Behavioral Health Plan of Care  Goal: Patient-Specific Goal (Individualization)  Description: Patient will eat >75% of meals.   Patient will maintain ADLs without prompting.   Patient will attend >50% of groups when able.     Outcome: Progressing     Face to face shift report received from Nataliia RANDHAWA. Rounding completed, pt observed.     Pt appeared to sleep a total of 6 hours on and off this shift.    Face to face report will be communicated to oncoming RN.    Tiffany Lowry RN  1/7/2025  6:22 AM

## 2025-01-07 NOTE — PROGRESS NOTES
1:1 time with the patient.  No changes made to the discharge plan at this time. Confinement hearing 1/9 @ 9am.

## 2025-01-08 PROCEDURE — 250N000013 HC RX MED GY IP 250 OP 250 PS 637

## 2025-01-08 PROCEDURE — 250N000013 HC RX MED GY IP 250 OP 250 PS 637: Performed by: NURSE PRACTITIONER

## 2025-01-08 PROCEDURE — 99232 SBSQ HOSP IP/OBS MODERATE 35: CPT | Performed by: NURSE PRACTITIONER

## 2025-01-08 PROCEDURE — 124N000001 HC R&B MH

## 2025-01-08 RX ADMIN — NICOTINE POLACRILEX 2 MG: 2 LOZENGE ORAL at 14:57

## 2025-01-08 RX ADMIN — NICOTINE POLACRILEX 2 MG: 2 LOZENGE ORAL at 17:21

## 2025-01-08 RX ADMIN — NICOTINE POLACRILEX 2 MG: 2 LOZENGE ORAL at 16:08

## 2025-01-08 RX ADMIN — NICOTINE POLACRILEX 2 MG: 2 LOZENGE ORAL at 06:16

## 2025-01-08 RX ADMIN — NICOTINE POLACRILEX 2 MG: 2 LOZENGE ORAL at 09:16

## 2025-01-08 RX ADMIN — HYDROXYZINE HYDROCHLORIDE 50 MG: 25 TABLET ORAL at 10:16

## 2025-01-08 RX ADMIN — OLANZAPINE 10 MG: 10 TABLET, ORALLY DISINTEGRATING ORAL at 20:46

## 2025-01-08 RX ADMIN — HYDROXYZINE HYDROCHLORIDE 50 MG: 25 TABLET ORAL at 19:31

## 2025-01-08 RX ADMIN — OLANZAPINE 10 MG: 10 TABLET, ORALLY DISINTEGRATING ORAL at 08:12

## 2025-01-08 RX ADMIN — NICOTINE POLACRILEX 2 MG: 2 LOZENGE ORAL at 11:43

## 2025-01-08 RX ADMIN — NICOTINE POLACRILEX 2 MG: 2 LOZENGE ORAL at 13:47

## 2025-01-08 RX ADMIN — NICOTINE POLACRILEX 2 MG: 2 LOZENGE ORAL at 10:16

## 2025-01-08 RX ADMIN — NICOTINE POLACRILEX 2 MG: 2 LOZENGE ORAL at 20:46

## 2025-01-08 RX ADMIN — NICOTINE POLACRILEX 2 MG: 2 LOZENGE ORAL at 08:13

## 2025-01-08 RX ADMIN — NICOTINE POLACRILEX 2 MG: 2 LOZENGE ORAL at 19:14

## 2025-01-08 RX ADMIN — OMEGA-3 FATTY ACIDS CAP 1000 MG 1 G: 1000 CAP at 08:12

## 2025-01-08 RX ADMIN — QUETIAPINE 400 MG: 100 TABLET ORAL at 20:46

## 2025-01-08 RX ADMIN — Medication 1 PATCH: at 08:15

## 2025-01-08 RX ADMIN — LAMOTRIGINE 50 MG: 25 TABLET ORAL at 20:46

## 2025-01-08 RX ADMIN — Medication 1 TABLET: at 08:12

## 2025-01-08 RX ADMIN — LAMOTRIGINE 50 MG: 25 TABLET ORAL at 08:12

## 2025-01-08 ASSESSMENT — ACTIVITIES OF DAILY LIVING (ADL)
ADLS_ACUITY_SCORE: 20
DRESS: SCRUBS (BEHAVIORAL HEALTH);INDEPENDENT
HYGIENE/GROOMING: INDEPENDENT
ADLS_ACUITY_SCORE: 20
DRESS: INDEPENDENT;SCRUBS (BEHAVIORAL HEALTH)
ORAL_HYGIENE: INDEPENDENT
ADLS_ACUITY_SCORE: 20
LAUNDRY: UNABLE TO COMPLETE
ADLS_ACUITY_SCORE: 20
ORAL_HYGIENE: INDEPENDENT
ADLS_ACUITY_SCORE: 20
ADLS_ACUITY_SCORE: 20
HYGIENE/GROOMING: INDEPENDENT
LAUNDRY: UNABLE TO COMPLETE

## 2025-01-08 NOTE — PROGRESS NOTES
"[facility] PSYCHIATRY  PROGRESS NOTE     SUBJECTIVE     Prior to interviewing the patient, I met with nursing and reviewed patient's clinical condition. We discussed clinical care both before and after the interview. I have reviewed the patient's clinical course by review of records including previous notes, labs, and vital signs.     Per nursing, the patient had the following behavioral events over the last 24-hours: Calm and cooperative. Denies thoughts of harming self or others. Slept 6 hours.     On psychiatric interview, he is seen in [facility] area. Reports he is overstimulated, wearing earplugs, pacing and bouncing the ball on the wall. States that he believes his entire issue is related to insomnia and also that he has autism. Advised that not sleeping contributes to leigh ann. Reports he plans on targeting his sleep with seroquel and will eventually get to the point that medications don't work. He plans on using seroquel to sleep and after 2 doses will go into the emergency room for a shot of Zyprexa. Reports mood is \"flat\". Denies any SE of daytime Lamotrigine dose. 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     Discussing follow up after discharge, he is agreeable to a DBT or PHP program. He's unsure what his career plans are, considering moving back to [place] to living with a friend and go more into Psychology, feels he can help others as he's developed skills in this area and can related to people with mental health issues. He's also considering staying in [place] keeping his current position to pay down debt.     After interview, pt stops provider in the [place] stating he wants me to know that he is wearing earplugs, pacing and bouncing the ball to stay present so his brain doesn't over think or go off someplace else.        MEDICATIONS   Scheduled Meds:  Current Facility-Administered Medications " "  Medication Dose Route Frequency Provider Last Rate Last Admin    fish oil-omega-3 fatty acids capsule 1 g  1 g Oral Daily Tariq Rand APRN CNP   1 g at 01/08/25 0812    lamoTRIgine (LaMICtal) tablet 50 mg  50 mg Oral BID Mel Greene APRN CNP   50 mg at 01/08/25 0812    multivitamin w/minerals (THERA-VIT-M) tablet 1 tablet  1 tablet Oral Daily Tariq Rand APRN CNP   1 tablet at 01/08/25 0812    nicotine (NICODERM CQ) 21 MG/24HR 24 hr patch 1 patch  1 patch Transdermal Daily Dariana Daley NP   1 patch at 01/08/25 0815    OLANZapine zydis (zyPREXA) ODT tab 10 mg  10 mg Oral BID Dariana Daley NP   10 mg at 01/08/25 0812    QUEtiapine (SEROquel) tablet 400 mg  400 mg Oral At Bedtime Dariana Daley NP   400 mg at 01/07/25 2103     PRN Meds:.  Current Facility-Administered Medications   Medication Dose Route Frequency Provider Last Rate Last Admin    acetaminophen (TYLENOL) tablet 650 mg  650 mg Oral Q4H PRN Dariana Daley NP        alum & mag hydroxide-simethicone (MAALOX) suspension 30 mL  30 mL Oral Q4H PRN Dariana Daley NP        hydrOXYzine HCl (ATARAX) tablet 50 mg  50 mg Oral Q4H PRN Dariana Daley NP   50 mg at 01/07/25 1815    melatonin tablet 3 mg  3 mg Oral At Bedtime PRN Dariana Daley NP        nicotine (COMMIT) lozenge 2 mg  2 mg Buccal Q1H PRN Mel Greene APRN CNP   2 mg at 01/08/25 0813    OLANZapine (zyPREXA) tablet 10 mg  10 mg Oral BID PRN Dariana Daley NP        Or    OLANZapine (zyPREXA) injection 10 mg  10 mg Intramuscular BID PRN Dariana Daley NP            ALLERGIES   No Known Allergies     MENTAL STATUS EXAM   Vitals: /64 (BP Location: Right arm)   Pulse 92   Temp 97.1  F (36.2  C) (Temporal)   Resp 18   Ht 1.829 m (6')   Wt 106.6 kg (235 lb)   SpO2 98%   BMI 31.87 kg/m      Appearance: Alert, oriented, dressed in hospital scrubs  Attitude: Cooperative  Eye Contact: Fair  Mood: \"flat\"  Affect: Blunted, reduced range "   Speech: Normal rate and rhythm   Psychomotor Behavior: No TD or rigidity. No tremor or akathisia.   Thought Process:  Linear conversation  Associations: No loose associations   Thought Content: Denies SI, plan, or SIB. Denies AVH. Grandiose thoughts  Insight: Limited  Judgment: Limited  Oriented to: Person, place, and time  Attention Span and Concentration: Intact  Recent and Remote Memory: Intact  Language: English with appropriate syntax and vocabulary  Fund of Knowledge: Average   Muscle Strength and Tone: Grossly normal  Gait and Station: Grossly normal       LABS   No results found for this or any previous visit (from the past 24 hours).        IMPRESSION     This is a 24 year old male with a PMH of bipolar I disorder and factitious disorder with previous hospitalizations in a similar state who presents in apparent mixed episode with psychotic features with SI. Reportedly had not slept for 3 or more days PTA and was more agitated and irritable. This could be influenced by possible medication non-compliance, subtherapeutic doses or psychosocial stressors. Likely is multifactorial. Pt did report medication compliance. It does not appear that substances are influencing presentation with negative UDS and pt report of abstinence from such substances for the last several years. Sx related to bipolar have clearly caused disruption in multiple social spheres where inpatient stabilization is recommended for further safety and stabilization. Petition for commitment through [facility] filed in ER with [facility] picking up case. Awaiting dates.      On arrival, pt leigh ann and psychosis appears to be softening with the introduction of scheduled Seroquel and Zyprexa. Pt does continue to have Taoist preoccupations and grandiose delusions. Per ED chart, it was recommended Zyprexa be added for a brief period to aid in reduction on sx. Lamictal will likely need to be titrated. Ordered a CMP, CBC and Vit D with the CMP and CBC  "unremarkable. Vit D is pending. Added fish oil and MVI per pt request with fish oil adding potential benefit in leigh ann.     Today: Linear conversation, insight is limited. 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 Compliant with medications, respectful, agrees with in-person therapy, IOP DBT or PHP program and continued medication management.        DIAGNOSES     #. Bipolar I disorder, current episode mixed with psychotic features  #. Factitious disorder by hx  #. R/O Medication Noncompliance  #. R/O Personality Disorder       PLAN     Location: Unit 5  Legal Status: Orders Placed This Encounter      Court Hold    Safety Assessment:    Behavioral Orders   Procedures    Code 1 - Restrict to Unit    Routine Programming     As clinically indicated    Status 15     Every 15 minutes.      PTA psychotropic medications held:      - Wellbutrin 150 mg daily     PTA psychotropic medications continued/changed:      - Lamictal 50 mg at bedtime -> BID dosing 1/7/2025  - Seroquel 400 mg at bedtime  - Zyprexa 10 mg BID     New medications initiated:      - Standard unit PRNs  - Fish oil 1 g daily    Today's Changes:    - None    Programming: Patient will be treated in a therapeutic milieu with appropriate individual and group therapies. Education will be provided on diagnoses, medications, and treatments.     Medical diagnoses:  Per medicine    Consult: None  Tests: CBC, CMP unremarkable. Vit D pending     Anticipated LOS: >5 days  Disposition: IOP DBT program or PHP, TBD with civil commitment        TREATMENT TEAM CARE PLAN     Progress: Continued symptoms.    Continued Stay Criteria/Rationale: Continued symptoms without sufficient improvement/resolution.    Medical/Physical: See above.    Precautions: See above.     Plan: Continue " inpatient care with unit support and medication management.    Rationale for change in precautions or plan: NA due to no change.    Participants: KRUNAL Rossi CNP, Nursing, SW, OT.    The patient's care was discussed with the treatment team and chart notes were reviewed.       ATTESTATION      KRUNAL Hansen, PMHNP-BC, FNP-C

## 2025-01-08 NOTE — PLAN OF CARE
Problem: Adult Behavioral Health Plan of Care  Goal: Patient-Specific Goal (Individualization)  Description: Patient will eat >75% of meals.   Patient will maintain ADLs without prompting.   Patient will attend >50% of groups when able.     Outcome: Progressing     Problem: Thought Process Alteration  Goal: Optimal Thought Clarity  Description: Patient will be able have a reality based conversation by discharge.   Patient will be free of paranoia by discharge.  Patient will sleep >6 hours of sleep per night.   Outcome: Progressing   Goal Outcome Evaluation:             Pt. Has been up and about on unit, ate breakfast in dayroom, appetite is good, eating at least 75% of meals, taking prescribed medications, slept 7 hours last night, is able to make needs be known, denies suicidal ideation, endorses anxiety, is independent with ADL's, spending time in dayroom, working on a puzzle with peers, will continue to monitor progress.  1016-  Pt. Requested/received atarax 50 mg po for anxiety.      Face to face end of shift report will be communicated to oncoming afternoon shift RN.     Mariza Darnell RN  1/8/2025  9:21 AM

## 2025-01-08 NOTE — PLAN OF CARE
Problem: Adult Behavioral Health Plan of Care  Goal: Patient-Specific Goal (Individualization)  Description: Patient will eat >75% of meals.   Patient will maintain ADLs without prompting.   Patient will attend >50% of groups when able.     Outcome: Progressing     Problem: Thought Process Alteration  Goal: Optimal Thought Clarity  Description: Patient will be able have a reality based conversation by discharge.   Patient will be free of paranoia by discharge.  Patient will sleep >6 hours of sleep per night.   Outcome: Progressing     Face to face shift report received from Judy RANDHAWA. Rounding completed, pt observed.     Pt appeared to sleep most of this shift.    Face to face report will be communicated to oncoming RN.    Tiffany Lowry RN  1/8/2025  6:18 AM

## 2025-01-08 NOTE — PROGRESS NOTES
1:1 with pt. Pt's court was moved 9am to 3:30pm and he was notified. Pt talked about what his goals are for the commitment. He would like to get his sleep under control because that will help his symptoms. He would also like to go into psychology as he feels his experience with his MH would be helpful in helping others.     Sw stated she will start sending referrals for DBT or PHP in his area. Sw also recommended med management. Pt was in agreement.
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
## 2025-01-09 VITALS
BODY MASS INDEX: 31.83 KG/M2 | WEIGHT: 235 LBS | DIASTOLIC BLOOD PRESSURE: 75 MMHG | SYSTOLIC BLOOD PRESSURE: 124 MMHG | TEMPERATURE: 97.1 F | HEIGHT: 72 IN | OXYGEN SATURATION: 95 % | RESPIRATION RATE: 18 BRPM | HEART RATE: 96 BPM

## 2025-01-09 PROCEDURE — 124N000001 HC R&B MH

## 2025-01-09 PROCEDURE — 99232 SBSQ HOSP IP/OBS MODERATE 35: CPT | Performed by: NURSE PRACTITIONER

## 2025-01-09 PROCEDURE — 250N000013 HC RX MED GY IP 250 OP 250 PS 637: Performed by: NURSE PRACTITIONER

## 2025-01-09 PROCEDURE — 250N000013 HC RX MED GY IP 250 OP 250 PS 637

## 2025-01-09 RX ADMIN — NICOTINE POLACRILEX 2 MG: 2 LOZENGE ORAL at 19:01

## 2025-01-09 RX ADMIN — Medication 1 TABLET: at 08:09

## 2025-01-09 RX ADMIN — OLANZAPINE 10 MG: 10 TABLET, ORALLY DISINTEGRATING ORAL at 20:24

## 2025-01-09 RX ADMIN — NICOTINE POLACRILEX 2 MG: 2 LOZENGE ORAL at 20:25

## 2025-01-09 RX ADMIN — NICOTINE POLACRILEX 2 MG: 2 LOZENGE ORAL at 08:54

## 2025-01-09 RX ADMIN — LAMOTRIGINE 50 MG: 25 TABLET ORAL at 20:24

## 2025-01-09 RX ADMIN — NICOTINE POLACRILEX 2 MG: 2 LOZENGE ORAL at 13:15

## 2025-01-09 RX ADMIN — Medication 1 PATCH: at 08:09

## 2025-01-09 RX ADMIN — NICOTINE POLACRILEX 2 MG: 2 LOZENGE ORAL at 11:55

## 2025-01-09 RX ADMIN — OLANZAPINE 10 MG: 10 TABLET, FILM COATED ORAL at 10:15

## 2025-01-09 RX ADMIN — HYDROXYZINE HYDROCHLORIDE 50 MG: 25 TABLET ORAL at 09:28

## 2025-01-09 RX ADMIN — NICOTINE POLACRILEX 2 MG: 2 LOZENGE ORAL at 14:16

## 2025-01-09 RX ADMIN — NICOTINE POLACRILEX 2 MG: 2 LOZENGE ORAL at 07:50

## 2025-01-09 RX ADMIN — HYDROXYZINE HYDROCHLORIDE 50 MG: 25 TABLET ORAL at 17:25

## 2025-01-09 RX ADMIN — NICOTINE POLACRILEX 2 MG: 2 LOZENGE ORAL at 09:57

## 2025-01-09 RX ADMIN — NICOTINE POLACRILEX 2 MG: 2 LOZENGE ORAL at 16:17

## 2025-01-09 RX ADMIN — OMEGA-3 FATTY ACIDS CAP 1000 MG 1 G: 1000 CAP at 08:09

## 2025-01-09 RX ADMIN — NICOTINE POLACRILEX 2 MG: 2 LOZENGE ORAL at 17:25

## 2025-01-09 RX ADMIN — NICOTINE POLACRILEX 2 MG: 2 LOZENGE ORAL at 06:22

## 2025-01-09 RX ADMIN — QUETIAPINE 400 MG: 100 TABLET ORAL at 20:24

## 2025-01-09 RX ADMIN — LAMOTRIGINE 50 MG: 25 TABLET ORAL at 08:09

## 2025-01-09 RX ADMIN — OLANZAPINE 10 MG: 10 TABLET, ORALLY DISINTEGRATING ORAL at 08:10

## 2025-01-09 ASSESSMENT — ACTIVITIES OF DAILY LIVING (ADL)
ORAL_HYGIENE: INDEPENDENT
ADLS_ACUITY_SCORE: 20
HYGIENE/GROOMING: INDEPENDENT
ADLS_ACUITY_SCORE: 20
ORAL_HYGIENE: INDEPENDENT
DRESS: SCRUBS (BEHAVIORAL HEALTH);INDEPENDENT
ADLS_ACUITY_SCORE: 20
HYGIENE/GROOMING: INDEPENDENT
ADLS_ACUITY_SCORE: 20
ADLS_ACUITY_SCORE: 20
LAUNDRY: UNABLE TO COMPLETE
ADLS_ACUITY_SCORE: 20
ADLS_ACUITY_SCORE: 20
LAUNDRY: UNABLE TO COMPLETE
ADLS_ACUITY_SCORE: 20
DRESS: SCRUBS (BEHAVIORAL HEALTH);INDEPENDENT
ADLS_ACUITY_SCORE: 20

## 2025-01-09 NOTE — PROGRESS NOTES
[facility] PSYCHIATRY  PROGRESS NOTE     SUBJECTIVE     Prior to interviewing the patient, I met with nursing and reviewed patient's clinical condition. We discussed clinical care both before and after the interview. I have reviewed the patient's clinical course by review of records including previous notes, labs, and vital signs.     Per nursing, the patient had the following behavioral events over the last 24-hours: Calm and cooperative.      On psychiatric interview, [place]. Is nervous for prelim hearing, reassurance given. Advised that he is doing well. Discussed request for nicotine increase - pt wasn't actually using this prior to admission. Encouraged to decrease use, advised it's a stimulant and he should work on cutting back. He agrees with this.     He feels his mood is stable, he's finding the programing on the unit helpful. He is doing well with peers and working on self improvement as well as attending groups. Discussing follow up after discharge, he is agreeable to an in-person IOP DBT or PHP program, individual therapy and medication management.      Discussed his concerns with autism, advised d/t his social skills, his connection with family, his seeking socialism, that he does not meet criteria for the diagnosis. Education provided that he should explore his emotional responses, explore his mental health to better understand himself rather than seeking a diagnosis and he is receptive to this.        MEDICATIONS   Scheduled Meds:  Current Facility-Administered Medications   Medication Dose Route Frequency Provider Last Rate Last Admin    fish oil-omega-3 fatty acids capsule 1 g  1 g Oral Daily Tariq Rand APRN CNP   1 g at 01/09/25 0809    lamoTRIgine (LaMICtal) tablet 50 mg  50 mg Oral BID Mel Greene APRN CNP   50 mg at 01/09/25 0809    multivitamin w/minerals (THERA-VIT-M) tablet 1 tablet  1 tablet Oral Daily Tariq Rand APRN CNP   1 tablet at 01/09/25 0809  "   nicotine (NICODERM CQ) 21 MG/24HR 24 hr patch 1 patch  1 patch Transdermal Daily Dariana Daley NP   1 patch at 01/09/25 0809    OLANZapine zydis (zyPREXA) ODT tab 10 mg  10 mg Oral BID Dariana Daley NP   10 mg at 01/09/25 0810    QUEtiapine (SEROquel) tablet 400 mg  400 mg Oral At Bedtime Dariana Daley NP   400 mg at 01/08/25 2046     PRN Meds:.  Current Facility-Administered Medications   Medication Dose Route Frequency Provider Last Rate Last Admin    acetaminophen (TYLENOL) tablet 650 mg  650 mg Oral Q4H PRN Dariana Daley NP        alum & mag hydroxide-simethicone (MAALOX) suspension 30 mL  30 mL Oral Q4H PRN Dariana Daley NP        hydrOXYzine HCl (ATARAX) tablet 50 mg  50 mg Oral Q4H PRN Dariana Daley NP   50 mg at 01/08/25 1931    melatonin tablet 3 mg  3 mg Oral At Bedtime PRN Dariana Daley NP        nicotine (COMMIT) lozenge 2 mg  2 mg Buccal Q1H PRN Mel Greene APRN CNP   2 mg at 01/09/25 0750    OLANZapine (zyPREXA) tablet 10 mg  10 mg Oral BID PRN Dariana Daley NP        Or    OLANZapine (zyPREXA) injection 10 mg  10 mg Intramuscular BID PRN Dariana Daley NP            ALLERGIES   No Known Allergies     MENTAL STATUS EXAM   Vitals: /75 (BP Location: Right arm)   Pulse 81   Temp 97.5  F (36.4  C) (Temporal)   Resp 16   Ht 1.829 m (6')   Wt 106.6 kg (235 lb)   SpO2 98%   BMI 31.87 kg/m      Appearance: Alert, oriented, dressed in hospital scrubs  Attitude: Cooperative  Eye Contact: Fair  Mood: \"good\"  Affect: Blunted, reduced range   Speech: Normal rate and rhythm   Psychomotor Behavior: No TD or rigidity. No tremor or akathisia.   Thought Process:  Linear conversation  Associations: No loose associations   Thought Content: Denies SI, plan, or SIB. Denies AVH.   Insight: Improving  Judgment: Fair  Oriented to: Person, place, and time  Attention Span and Concentration: Intact  Recent and Remote Memory: Intact  Language: English with appropriate " syntax and vocabulary  Fund of Knowledge: Average   Muscle Strength and Tone: Grossly normal  Gait and Station: Grossly normal       LABS   No results found for this or any previous visit (from the past 24 hours).        IMPRESSION     This is a 24 year old male with a PMH of bipolar I disorder and factitious disorder with previous hospitalizations in a similar state who presents in apparent mixed episode with psychotic features with SI. Reportedly had not slept for 3 or more days PTA and was more agitated and irritable. This could be influenced by possible medication non-compliance, subtherapeutic doses or psychosocial stressors. Likely is multifactorial. Pt did report medication compliance. It does not appear that substances are influencing presentation with negative UDS and pt report of abstinence from such substances for the last several years. Sx related to bipolar have clearly caused disruption in multiple social spheres where inpatient stabilization is recommended for further safety and stabilization. Petition for commitment through [facility] filed in ER with [facility] picking up case. Awaiting dates.      On arrival, pt leigh ann and psychosis appears to be softening with the introduction of scheduled Seroquel and Zyprexa. Pt does continue to have Oriental orthodox preoccupations and grandiose delusions. Per ED chart, it was recommended Zyprexa be added for a brief period to aid in reduction on sx. Lamictal will likely need to be titrated. Ordered a CMP, CBC and Vit D with the CMP and CBC unremarkable. Vit D is pending. Added fish oil and MVI per pt request with fish oil adding potential benefit in leigh ann.     Today: Linear conversation, insight is improving, he's receptive to education. Agrees he is not autistic. Compliant with medications, respectful, agrees with in-person therapy, IOP DBT or PHP program and continued medication management.        DIAGNOSES     #. Bipolar I disorder, current episode mixed with  psychotic features  #. Factitious disorder by hx  #. R/O Medication Noncompliance  #. R/O Personality Disorder       PLAN     Location: Unit 5  Legal Status: Orders Placed This Encounter      Court Hold    Safety Assessment:    Behavioral Orders   Procedures    Code 1 - Restrict to Unit    Routine Programming     As clinically indicated    Status 15     Every 15 minutes.      PTA psychotropic medications held:      - Wellbutrin 150 mg daily     PTA psychotropic medications continued/changed:      - Lamictal 50 mg at bedtime -> BID dosing 1/7/2025  - Seroquel 400 mg at bedtime  - Zyprexa 10 mg BID     New medications initiated:      - Standard unit PRNs  - Fish oil 1 g daily    Today's Changes:    - None    Programming: Patient will be treated in a therapeutic milieu with appropriate individual and group therapies. Education will be provided on diagnoses, medications, and treatments.     Medical diagnoses:  Per medicine    Consult: None  Tests: CBC, CMP unremarkable. Vit D pending     Anticipated LOS: >5 days  Disposition: IOP DBT program or PHP, TBD with civil commitment        TREATMENT TEAM CARE PLAN     Progress: Continued symptoms.    Continued Stay Criteria/Rationale: Continued symptoms without sufficient improvement/resolution.    Medical/Physical: See above.    Precautions: See above.     Plan: Continue inpatient care with unit support and medication management.    Rationale for change in precautions or plan: NA due to no change.    Participants: KRUNAL Rossi CNP, Nursing, SW, OT.    The patient's care was discussed with the treatment team and chart notes were reviewed.       ATTESTATION      KRUNAL Hansen, PMHNP-BC, FNP-C

## 2025-01-09 NOTE — PLAN OF CARE
Problem: Thought Process Alteration  Goal: Optimal Thought Clarity  Description: Patient will be able have a reality based conversation by discharge.   Patient will be free of paranoia by discharge.  Patient will sleep >6 hours of sleep per night.   Outcome: Progressing     Problem: Adult Behavioral Health Plan of Care  Goal: Patient-Specific Goal (Individualization)  Description: Patient will eat >75% of meals.   Patient will maintain ADLs without prompting.   Patient will attend >50% of groups when able.     Outcome: Progressing       Patient calm, cooperative, and medication compliant this shift.  He is social with peers and attending groups.  Denies suicidal thoughts.  Did report feeling anxious and took hydroxyzine 50 mg at 1931 with good relief of symptoms.  No complaints of pain.  Vs WNL.  Patient spoke with examiner from the [facility] and feels that the conversation went well.  He is agreeable to current treatment plan.  Face to face end of shift report communicated to night shift RN.     Graciela Downing RN  1/8/2025  9:12 PM

## 2025-01-09 NOTE — PROGRESS NOTES
Patient had his Preliminary Hearing today @ 3:15 pm. The patient and the court agreed to confinement until his next hearing.

## 2025-01-09 NOTE — PLAN OF CARE
Problem: Adult Behavioral Health Plan of Care  Goal: Patient-Specific Goal (Individualization)  Description: Patient will eat >75% of meals.   Patient will maintain ADLs without prompting.   Patient will attend >50% of groups when able.     Outcome: Progressing     Face to face shift report received from Graciela RANDHAWA. Rounding completed, pt observed.     Pt appeared to sleep most of this shift.    Face to face report will be communicated to oncoming RN.    Tiffany Lowry RN  1/9/2025  6:00 AM

## 2025-01-09 NOTE — PLAN OF CARE
Problem: Adult Behavioral Health Plan of Care  Goal: Patient-Specific Goal (Individualization)  Description: Patient will eat >75% of meals.   Patient will maintain ADLs without prompting.   Patient will attend >50% of groups when able.     Outcome: Progressing     Cooperative with assessment. Endorses anxiety and depression. Received PRN hydroxyzine 50 mg at 0928. States he is having anxiety due to court. Denies SI HI pain and hallucinations. He is social with peers and compliant with medications without issues. Free from falls. Makes needs known.   States he doesn't feel like the Atarax worked so received PRN Zyprexa 10 mg at 1015 for increased anxiety due to court this afternoon.     Problem: Thought Process Alteration  Goal: Optimal Thought Clarity  Description: Patient will be able have a reality based conversation by discharge.   Patient will be free of paranoia by discharge.  Patient will sleep >6 hours of sleep per night.   Outcome: Progressing     Face to face report given with opportunity to observe patient.    Report given to evening shift RN.     Annita Akbar RN   1/9/2025

## 2025-01-10 PROCEDURE — 99232 SBSQ HOSP IP/OBS MODERATE 35: CPT | Performed by: NURSE PRACTITIONER

## 2025-01-10 PROCEDURE — 124N000001 HC R&B MH

## 2025-01-10 PROCEDURE — 250N000013 HC RX MED GY IP 250 OP 250 PS 637: Performed by: NURSE PRACTITIONER

## 2025-01-10 PROCEDURE — 250N000013 HC RX MED GY IP 250 OP 250 PS 637

## 2025-01-10 RX ADMIN — OLANZAPINE 10 MG: 10 TABLET, ORALLY DISINTEGRATING ORAL at 08:15

## 2025-01-10 RX ADMIN — NICOTINE POLACRILEX 2 MG: 2 LOZENGE ORAL at 06:01

## 2025-01-10 RX ADMIN — NICOTINE POLACRILEX 2 MG: 2 LOZENGE ORAL at 09:13

## 2025-01-10 RX ADMIN — HYDROXYZINE HYDROCHLORIDE 50 MG: 25 TABLET ORAL at 09:13

## 2025-01-10 RX ADMIN — NICOTINE POLACRILEX 2 MG: 2 LOZENGE ORAL at 17:24

## 2025-01-10 RX ADMIN — NICOTINE POLACRILEX 2 MG: 2 LOZENGE ORAL at 10:46

## 2025-01-10 RX ADMIN — NICOTINE POLACRILEX 2 MG: 2 LOZENGE ORAL at 15:07

## 2025-01-10 RX ADMIN — NICOTINE POLACRILEX 2 MG: 2 LOZENGE ORAL at 21:11

## 2025-01-10 RX ADMIN — OLANZAPINE 10 MG: 10 TABLET, FILM COATED ORAL at 10:59

## 2025-01-10 RX ADMIN — NICOTINE POLACRILEX 2 MG: 2 LOZENGE ORAL at 20:11

## 2025-01-10 RX ADMIN — NICOTINE POLACRILEX 2 MG: 2 LOZENGE ORAL at 08:15

## 2025-01-10 RX ADMIN — Medication 1 PATCH: at 08:15

## 2025-01-10 RX ADMIN — LAMOTRIGINE 50 MG: 25 TABLET ORAL at 08:14

## 2025-01-10 RX ADMIN — NICOTINE POLACRILEX 2 MG: 2 LOZENGE ORAL at 18:55

## 2025-01-10 RX ADMIN — NICOTINE POLACRILEX 2 MG: 2 LOZENGE ORAL at 13:29

## 2025-01-10 RX ADMIN — OLANZAPINE 10 MG: 10 TABLET, ORALLY DISINTEGRATING ORAL at 21:11

## 2025-01-10 RX ADMIN — Medication 1 TABLET: at 08:14

## 2025-01-10 RX ADMIN — NICOTINE POLACRILEX 2 MG: 2 LOZENGE ORAL at 12:17

## 2025-01-10 RX ADMIN — HYDROXYZINE HYDROCHLORIDE 50 MG: 25 TABLET ORAL at 17:24

## 2025-01-10 RX ADMIN — LAMOTRIGINE 50 MG: 25 TABLET ORAL at 21:11

## 2025-01-10 RX ADMIN — OMEGA-3 FATTY ACIDS CAP 1000 MG 1 G: 1000 CAP at 08:14

## 2025-01-10 RX ADMIN — QUETIAPINE 400 MG: 100 TABLET ORAL at 21:11

## 2025-01-10 ASSESSMENT — ACTIVITIES OF DAILY LIVING (ADL)
ADLS_ACUITY_SCORE: 20

## 2025-01-10 NOTE — PLAN OF CARE
Face to face shift report received from Tiffany RANDHAWA. Rounding completed, pt observed in lounge at the start of the shift.    Patient in lounge throughout the day. Walking the halls and listening to music. Alert and making needs known. Eating well for meals. Flat affect. Pleasant during conversation with this writer. Compliant with scheduled medication and nursing assessment. Endorses anxiety this morning stating he is getting bored here. Requested and received hydroxyzine 50 mg at 0913. Denies hallucinations, SI/HI, and pain. States he is looking forward to visiting with his aunt and grandma tomorrow. Requested and received Zyprexa 10 mg at 1059 for irritability. Improved mood throughout the evening.    Problem: Adult Behavioral Health Plan of Care  Goal: Patient-Specific Goal (Individualization)  Description: Patient will eat >75% of meals.   Patient will maintain ADLs without prompting.   Patient will attend >50% of groups when able.     Outcome: Progressing     Problem: Thought Process Alteration  Goal: Optimal Thought Clarity  Description: Patient will be able have a reality based conversation by discharge.   Patient will be free of paranoia by discharge.  Patient will sleep >6 hours of sleep per night.   Outcome: Progressing    Face to face report will be communicated to oncoming SYDNEE.    Daily Mann RN  1/10/2025

## 2025-01-10 NOTE — PROGRESS NOTES
[facility] PSYCHIATRY  PROGRESS NOTE     SUBJECTIVE     Prior to interviewing the patient, I met with nursing and reviewed patient's clinical condition. We discussed clinical care both before and after the interview. I have reviewed the patient's clinical course by review of records including previous notes, labs, and vital signs.     Per nursing, the patient had the following behavioral events over the last 24-hours: Calm and cooperative.      On psychiatric interview, he is seen in [facility]. States that court went well yesterday, his mood is good. He feels energetic. He feels good being here and is able to work on a lot of things he's been concerned with. Discussed working on his anger management, notes he is not angry here and wonders how he can work on this. Advised to do with outpt therapist and he agrees with this.        MEDICATIONS   Scheduled Meds:  Current Facility-Administered Medications   Medication Dose Route Frequency Provider Last Rate Last Admin     fish oil-omega-3 fatty acids capsule 1 g  1 g Oral Daily Tariq Rand APRN CNP   1 g at 01/10/25 0814     lamoTRIgine (LaMICtal) tablet 50 mg  50 mg Oral BID Mel Greene APRN CNP   50 mg at 01/10/25 0814     multivitamin w/minerals (THERA-VIT-M) tablet 1 tablet  1 tablet Oral Daily Tariq Rand APRN CNP   1 tablet at 01/10/25 0814     nicotine (NICODERM CQ) 21 MG/24HR 24 hr patch 1 patch  1 patch Transdermal Daily Dariana Daley NP   1 patch at 01/10/25 0815     OLANZapine zydis (zyPREXA) ODT tab 10 mg  10 mg Oral BID Dariana Daley NP   10 mg at 01/10/25 0815     QUEtiapine (SEROquel) tablet 400 mg  400 mg Oral At Bedtime Dariana Daley NP   400 mg at 01/09/25 2024     PRN Meds:.  Current Facility-Administered Medications   Medication Dose Route Frequency Provider Last Rate Last Admin     acetaminophen (TYLENOL) tablet 650 mg  650 mg Oral Q4H PRN Dariana Daley NP         alum & mag hydroxide-simethicone (MAALOX)  "suspension 30 mL  30 mL Oral Q4H PRN Dariana Daley, NP         hydrOXYzine HCl (ATARAX) tablet 50 mg  50 mg Oral Q4H PRN Dariana Daley NP   50 mg at 01/09/25 1725     melatonin tablet 3 mg  3 mg Oral At Bedtime PRN Dariana Daley NP         nicotine (COMMIT) lozenge 2 mg  2 mg Buccal Q1H PRN Mel Greene APRN CNP   2 mg at 01/10/25 0815     OLANZapine (zyPREXA) tablet 10 mg  10 mg Oral BID PRN Dariana Daley, NP   10 mg at 01/09/25 1015    Or     OLANZapine (zyPREXA) injection 10 mg  10 mg Intramuscular BID PRN Dariana Daley NP            ALLERGIES   No Known Allergies     MENTAL STATUS EXAM   Vitals: /77 (BP Location: Right arm)   Pulse 68   Temp 97.8  F (36.6  C) (Temporal)   Resp 16   Ht 1.829 m (6')   Wt 106.6 kg (235 lb)   SpO2 97%   BMI 31.87 kg/m      Appearance: Alert, oriented, dressed in hospital scrubs  Attitude: Cooperative  Eye Contact: Fair  Mood: \"good\"  Affect: Blunted, reduced range   Speech: Normal rate and rhythm   Psychomotor Behavior: No TD or rigidity. No tremor or akathisia.   Thought Process:  Linear conversation  Associations: No loose associations   Thought Content: Denies SI, plan, or SIB. Denies AVH.   Insight: Improving  Judgment: Fair  Oriented to: Person, place, and time  Attention Span and Concentration: Intact  Recent and Remote Memory: Intact  Language: English with appropriate syntax and vocabulary  Fund of Knowledge: Average   Muscle Strength and Tone: Grossly normal  Gait and Station: Grossly normal       LABS   No results found for this or any previous visit (from the past 24 hours).        IMPRESSION     This is a 24 year old male with a PMH of bipolar I disorder and factitious disorder with previous hospitalizations in a similar state who presents in apparent mixed episode with psychotic features with SI. Reportedly had not slept for 3 or more days PTA and was more agitated and irritable. This could be influenced by possible medication " non-compliance, subtherapeutic doses or psychosocial stressors. Likely is multifactorial. Pt did report medication compliance. It does not appear that substances are influencing presentation with negative UDS and pt report of abstinence from such substances for the last several years. Sx related to bipolar have clearly caused disruption in multiple social spheres where inpatient stabilization is recommended for further safety and stabilization. Petition for commitment through [facility] filed in ER with [facility] picking up case. Awaiting dates.      On arrival, pt leigh ann and psychosis appears to be softening with the introduction of scheduled Seroquel and Zyprexa. Pt does continue to have Congregational preoccupations and grandiose delusions. Per ED chart, it was recommended Zyprexa be added for a brief period to aid in reduction on sx. Lamictal will likely need to be titrated. Ordered a CMP, CBC and Vit D with the CMP and CBC unremarkable. Vit D is pending. Added fish oil and MVI per pt request with fish oil adding potential benefit in leigh ann.     Today: Linear conversation, insight is improving, he's receptive to education.  Compliant with medications, respectful, agrees with in-person therapy, IOP DBT or PHP program and continued medication management.        DIAGNOSES     #. Bipolar I disorder, current episode mixed with psychotic features  #. Factitious disorder by hx  #. R/O Medication Noncompliance  #. R/O Personality Disorder       PLAN     Location: Unit 5  Legal Status: Orders Placed This Encounter      Court Hold    Safety Assessment:    Behavioral Orders   Procedures     Code 1 - Restrict to Unit     Routine Programming     As clinically indicated     Status 15     Every 15 minutes.      PTA psychotropic medications held:      - Wellbutrin 150 mg daily     PTA psychotropic medications continued/changed:      - Lamictal 50 mg at bedtime -> BID dosing 1/7/2025  - Seroquel 400 mg at bedtime  - Zyprexa 10 mg BID      New medications initiated:      - Standard unit PRNs  - Fish oil 1 g daily    Today's Changes:    - None    Programming: Patient will be treated in a therapeutic milieu with appropriate individual and group therapies. Education will be provided on diagnoses, medications, and treatments.     Medical diagnoses:  Per medicine    Consult: None  Tests: CBC, CMP unremarkable. Vit D pending     Anticipated LOS: >5 days  Disposition: IOP DBT program or PHP, TBD with civil commitment        TREATMENT TEAM CARE PLAN     Progress: Continued symptoms.    Continued Stay Criteria/Rationale: Continued symptoms without sufficient improvement/resolution.    Medical/Physical: See above.    Precautions: See above.     Plan: Continue inpatient care with unit support and medication management.    Rationale for change in precautions or plan: NA due to no change.    Participants: KRUNAL Rossi CNP, Nursing, SW, OT.    The patient's care was discussed with the treatment team and chart notes were reviewed.       ATTESTATION      KRUNAL Hansen, PMHNP-BC, FNP-C

## 2025-01-10 NOTE — PLAN OF CARE
Problem: Adult Behavioral Health Plan of Care  Goal: Patient-Specific Goal (Individualization)  Description: Patient will eat >75% of meals.   Patient will maintain ADLs without prompting.   Patient will attend >50% of groups when able.     Outcome: Progressing     Problem: Thought Process Alteration  Goal: Optimal Thought Clarity  Description: Patient will be able have a reality based conversation by discharge.   Patient will be free of paranoia by discharge.  Patient will sleep >6 hours of sleep per night.   Outcome: Progressing     Face to face shift report received from Graciela RANDHAWA. Rounding completed, pt observed.

## 2025-01-10 NOTE — PLAN OF CARE
Problem: Adult Behavioral Health Plan of Care  Goal: Patient-Specific Goal (Individualization)  Description: Patient will eat >75% of meals.   Patient will maintain ADLs without prompting.   Patient will attend >50% of groups when able.     Outcome: Progressing     Problem: Thought Process Alteration  Goal: Optimal Thought Clarity  Description: Patient will be able have a reality based conversation by discharge.   Patient will be free of paranoia by discharge.  Patient will sleep >6 hours of sleep per night.   Outcome: Progressing     Patient calm, cooperative, and medication compliant this shift.  Social with peers and attending groups.  Able to have a linear, reality based conversation with staff.  Reported feeling anxious and took 50 mg hydroxyzine with good relief of symptoms.  No complaints of pain.  VS WNL.  Face to face end of shift report communicated to night shift RN.     Graciela Downing RN  1/9/2025  8:30 PM

## 2025-01-10 NOTE — PLAN OF CARE
"Face to face end of shift report received from Daily HUTCHINS RN. Rounding completed and patient observed in the lounge. No requests at this time.      Goal Outcome Evaluation: Patient has been pleasant and cooperative. He reported his mood has been \"good.\" He remembered this writer from admit and said \"ya, I wasn't that far off when I got here.\" Patient denied pain. He said he's been \"playing the peacekeeper\" on the unit with new peers that come. He said he doesn't usually let peers' behaviors bother him. He said he's \"pretty laid back.\" Patient showered. He reported he's been going to a few groups but he usually leaves         Problem: Adult Behavioral Health Plan of Care  Goal: Patient-Specific Goal (Individualization)  Description: Patient will eat >75% of meals.   Patient will maintain ADLs without prompting.   Patient will attend >50% of groups when able.     Outcome: Progressing     Problem: Thought Process Alteration  Goal: Optimal Thought Clarity  Description: Patient will be able have a reality based conversation by discharge.   Patient will be free of paranoia by discharge.  Patient will sleep >6 hours of sleep per night.   Outcome: Progressing                    "

## 2025-01-10 NOTE — PROGRESS NOTES
BRIAN FRANCISCO.  Patient requested visit with the  for the purpose of talking about anger issues/causes/etc.   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 He believes his own experiences and learning will be helpful to others. We discussed these issues and helped him to clarify some of his feelings and desires tamping down undue optimism.  He appreciated the visit..

## 2025-01-11 PROCEDURE — 124N000001 HC R&B MH

## 2025-01-11 PROCEDURE — 99232 SBSQ HOSP IP/OBS MODERATE 35: CPT | Performed by: NURSE PRACTITIONER

## 2025-01-11 PROCEDURE — 250N000013 HC RX MED GY IP 250 OP 250 PS 637: Performed by: NURSE PRACTITIONER

## 2025-01-11 PROCEDURE — 250N000013 HC RX MED GY IP 250 OP 250 PS 637

## 2025-01-11 RX ADMIN — Medication 1 TABLET: at 08:12

## 2025-01-11 RX ADMIN — NICOTINE POLACRILEX 2 MG: 2 LOZENGE ORAL at 07:19

## 2025-01-11 RX ADMIN — NICOTINE POLACRILEX 2 MG: 2 LOZENGE ORAL at 06:07

## 2025-01-11 RX ADMIN — NICOTINE POLACRILEX 2 MG: 2 LOZENGE ORAL at 15:30

## 2025-01-11 RX ADMIN — NICOTINE POLACRILEX 2 MG: 2 LOZENGE ORAL at 13:57

## 2025-01-11 RX ADMIN — OLANZAPINE 10 MG: 10 TABLET, ORALLY DISINTEGRATING ORAL at 21:02

## 2025-01-11 RX ADMIN — NICOTINE POLACRILEX 2 MG: 2 LOZENGE ORAL at 17:41

## 2025-01-11 RX ADMIN — NICOTINE POLACRILEX 2 MG: 2 LOZENGE ORAL at 09:28

## 2025-01-11 RX ADMIN — QUETIAPINE 400 MG: 100 TABLET ORAL at 21:02

## 2025-01-11 RX ADMIN — NICOTINE POLACRILEX 2 MG: 2 LOZENGE ORAL at 12:40

## 2025-01-11 RX ADMIN — NICOTINE POLACRILEX 2 MG: 2 LOZENGE ORAL at 19:04

## 2025-01-11 RX ADMIN — NICOTINE POLACRILEX 2 MG: 2 LOZENGE ORAL at 08:22

## 2025-01-11 RX ADMIN — LAMOTRIGINE 50 MG: 25 TABLET ORAL at 08:12

## 2025-01-11 RX ADMIN — NICOTINE POLACRILEX 2 MG: 2 LOZENGE ORAL at 11:08

## 2025-01-11 RX ADMIN — OLANZAPINE 10 MG: 10 TABLET, ORALLY DISINTEGRATING ORAL at 08:21

## 2025-01-11 RX ADMIN — NICOTINE POLACRILEX 2 MG: 2 LOZENGE ORAL at 21:02

## 2025-01-11 RX ADMIN — NICOTINE POLACRILEX 2 MG: 2 LOZENGE ORAL at 16:30

## 2025-01-11 RX ADMIN — HYDROXYZINE HYDROCHLORIDE 50 MG: 25 TABLET ORAL at 09:28

## 2025-01-11 RX ADMIN — Medication 1 PATCH: at 08:15

## 2025-01-11 RX ADMIN — OMEGA-3 FATTY ACIDS CAP 1000 MG 1 G: 1000 CAP at 08:12

## 2025-01-11 RX ADMIN — LAMOTRIGINE 50 MG: 25 TABLET ORAL at 21:02

## 2025-01-11 ASSESSMENT — ACTIVITIES OF DAILY LIVING (ADL)
ADLS_ACUITY_SCORE: 20
ADLS_ACUITY_SCORE: 20
DRESS: SCRUBS (BEHAVIORAL HEALTH);INDEPENDENT
ADLS_ACUITY_SCORE: 20
HYGIENE/GROOMING: INDEPENDENT
ADLS_ACUITY_SCORE: 20
ORAL_HYGIENE: INDEPENDENT
ADLS_ACUITY_SCORE: 20
ORAL_HYGIENE: INDEPENDENT
ADLS_ACUITY_SCORE: 20
LAUNDRY: UNABLE TO COMPLETE
ADLS_ACUITY_SCORE: 20
LAUNDRY: UNABLE TO COMPLETE
ADLS_ACUITY_SCORE: 20
DRESS: SCRUBS (BEHAVIORAL HEALTH)
ADLS_ACUITY_SCORE: 20
HYGIENE/GROOMING: INDEPENDENT
ADLS_ACUITY_SCORE: 20

## 2025-01-11 NOTE — PLAN OF CARE
Problem: Adult Behavioral Health Plan of Care  Goal: Patient-Specific Goal (Individualization)  Description: Patient will eat >75% of meals.   Patient will maintain ADLs without prompting.   Patient will attend >50% of groups when able.   Outcome: Progressing     Problem: Thought Process Alteration  Goal: Optimal Thought Clarity  Description: Patient will be able have a reality based conversation by discharge.   Patient will be free of paranoia by discharge.  Patient will sleep >6 hours of sleep per night.   Outcome: Progressing    Face to face shift report received from previously assigned nurse. Rounding completed, pt observed watching television in the lounge with peers.    Patient is met with in the lounge. Denies pain, SI, HI, A/V hallucinations, and depression. Endorses some anxiety, although states it is tolerable at this time. Patient is pleasant and appropriate with staff and peers. No further needs at this time.     Compliant with HS medication administration. Patient remained social with peers, maintaining appropriate boundaries. No further needs at this time.     Face to face report communicated to oncoming RN.    Lauren Maldonado RN  1/11/2025  4:12 PM

## 2025-01-11 NOTE — PLAN OF CARE
Problem: Adult Behavioral Health Plan of Care  Goal: Patient-Specific Goal (Individualization)  Description: Patient will eat >75% of meals.   Patient will maintain ADLs without prompting.   Patient will attend >50% of groups when able.     Outcome: Progressing     Problem: Thought Process Alteration  Goal: Optimal Thought Clarity  Description: Patient will be able have a reality based conversation by discharge.   Patient will be free of paranoia by discharge.  Patient will sleep >6 hours of sleep per night.   Outcome: Progressing     Face to face shift report received from Gabriella RANDHAWA. Rounding completed, pt observed.     Pt appeared to be sleeping at the beginning of this shift. Pt awake after 0500 rounds.    Face to face report will be communicated to oncoming RN.    Tiffany Lowry RN  1/11/2025  6:16 AM

## 2025-01-11 NOTE — PROGRESS NOTES
[facility] PSYCHIATRY  PROGRESS NOTE     SUBJECTIVE     Prior to interviewing the patient, I met with nursing and reviewed patient's clinical condition. We discussed clinical care both before and after the interview. I have reviewed the patient's clinical course by review of records including previous notes, labs, and vital signs.     Per nursing, the patient had the following behavioral events over the last 24-hours: Calm and cooperative.      On psychiatric interview, he is seen in [facility] area. Dad was in to visit earlier, at that time Brian asked how he can display his anger safely on the unit. Given suggestions to manage. Later given a DBT handout for radical acceptance. Reiterated he will work with outpt therapist on this. Feels medications are in a good place, denies SE. No current concerns or requests.         MEDICATIONS   Scheduled Meds:  Current Facility-Administered Medications   Medication Dose Route Frequency Provider Last Rate Last Admin     fish oil-omega-3 fatty acids capsule 1 g  1 g Oral Daily Tariq Rand APRN CNP   1 g at 01/11/25 0812     lamoTRIgine (LaMICtal) tablet 50 mg  50 mg Oral BID Mel Greene APRN CNP   50 mg at 01/11/25 0812     multivitamin w/minerals (THERA-VIT-M) tablet 1 tablet  1 tablet Oral Daily Tariq Rand APRN CNP   1 tablet at 01/11/25 0812     nicotine (NICODERM CQ) 21 MG/24HR 24 hr patch 1 patch  1 patch Transdermal Daily Dariana Daley NP   1 patch at 01/11/25 0815     OLANZapine zydis (zyPREXA) ODT tab 10 mg  10 mg Oral BID Dariana Daley NP   10 mg at 01/11/25 0821     QUEtiapine (SEROquel) tablet 400 mg  400 mg Oral At Bedtime Dariana Daley NP   400 mg at 01/10/25 2111     PRN Meds:.  Current Facility-Administered Medications   Medication Dose Route Frequency Provider Last Rate Last Admin     acetaminophen (TYLENOL) tablet 650 mg  650 mg Oral Q4H PRN Dariana Daley NP         alum & mag hydroxide-simethicone (MAALOX) suspension 30 mL   "30 mL Oral Q4H PRN Dariana Daley, NP         hydrOXYzine HCl (ATARAX) tablet 50 mg  50 mg Oral Q4H PRN Dariana Daley, NP   50 mg at 01/10/25 1724     melatonin tablet 3 mg  3 mg Oral At Bedtime PRN Dariana Daley, NP         nicotine (COMMIT) lozenge 2 mg  2 mg Buccal Q1H PRN Mel Greene APRN CNP   2 mg at 01/11/25 0822     OLANZapine (zyPREXA) tablet 10 mg  10 mg Oral BID PRN Dariana Daley, NP   10 mg at 01/10/25 1059    Or     OLANZapine (zyPREXA) injection 10 mg  10 mg Intramuscular BID PRN Dariana Daley NP            ALLERGIES   No Known Allergies     MENTAL STATUS EXAM   Vitals: /76   Pulse 94   Temp 97.2  F (36.2  C) (Temporal)   Resp 16   Ht 1.829 m (6')   Wt 106.6 kg (235 lb)   SpO2 97%   BMI 31.87 kg/m      Appearance: Alert, oriented, dressed in hospital scrubs  Attitude: Cooperative  Eye Contact: Fair  Mood: \"good\"  Affect: Blunted, reduced range   Speech: Normal rate and rhythm   Psychomotor Behavior: No TD or rigidity. No tremor or akathisia.   Thought Process:  Linear conversation  Associations: No loose associations   Thought Content: Denies SI, plan, or SIB. Denies AVH.   Insight: Improving  Judgment: Fair  Oriented to: Person, place, and time  Attention Span and Concentration: Intact  Recent and Remote Memory: Intact  Language: English with appropriate syntax and vocabulary  Fund of Knowledge: Average   Muscle Strength and Tone: Grossly normal  Gait and Station: Grossly normal       LABS   No results found for this or any previous visit (from the past 24 hours).        IMPRESSION     This is a 24 year old male with a PMH of bipolar I disorder and factitious disorder with previous hospitalizations in a similar state who presents in apparent mixed episode with psychotic features with SI. Reportedly had not slept for 3 or more days PTA and was more agitated and irritable. This could be influenced by possible medication non-compliance, subtherapeutic doses or " psychosocial stressors. Likely is multifactorial. Pt did report medication compliance. It does not appear that substances are influencing presentation with negative UDS and pt report of abstinence from such substances for the last several years. Sx related to bipolar have clearly caused disruption in multiple social spheres where inpatient stabilization is recommended for further safety and stabilization. Petition for commitment through [facility] filed in ER with [facility] picking up case. Awaiting dates.      On arrival, pt leigh ann and psychosis appears to be softening with the introduction of scheduled Seroquel and Zyprexa. Pt does continue to have Yarsanism preoccupations and grandiose delusions. Per ED chart, it was recommended Zyprexa be added for a brief period to aid in reduction on sx. Lamictal will likely need to be titrated. Ordered a CMP, CBC and Vit D with the CMP and CBC unremarkable. Vit D is pending. Added fish oil and MVI per pt request with fish oil adding potential benefit in leigh ann.     Today: Linear conversation, insight is improving, he's receptive to education.  Compliant with medications, respectful, agrees with in-person therapy, IOP DBT or PHP program and continued medication management.        DIAGNOSES     #. Bipolar I disorder, current episode mixed with psychotic features  #. Factitious disorder by hx  #. R/O Medication Noncompliance  #. R/O Personality Disorder       PLAN     Location: Unit 5  Legal Status: Orders Placed This Encounter      Court Hold    Safety Assessment:    Behavioral Orders   Procedures     Code 1 - Restrict to Unit     Routine Programming     As clinically indicated     Status 15     Every 15 minutes.      PTA psychotropic medications held:      - Wellbutrin 150 mg daily     PTA psychotropic medications continued/changed:      - Lamictal 50 mg at bedtime -> BID dosing 1/7/2025  - Seroquel 400 mg at bedtime  - Zyprexa 10 mg BID     New medications initiated:      -  Standard unit PRNs  - Fish oil 1 g daily    Today's Changes:    - None    Programming: Patient will be treated in a therapeutic milieu with appropriate individual and group therapies. Education will be provided on diagnoses, medications, and treatments.     Medical diagnoses:  Per medicine    Consult: None  Tests: CBC, CMP unremarkable. Vit D pending     Anticipated LOS: >5 days  Disposition: IOP DBT program or PHP, TBD with civil commitment        TREATMENT TEAM CARE PLAN     Progress: Continued symptoms.    Continued Stay Criteria/Rationale: Continued symptoms without sufficient improvement/resolution.    Medical/Physical: See above.    Precautions: See above.     Plan: Continue inpatient care with unit support and medication management.    Rationale for change in precautions or plan: NA due to no change.    Participants: KRUNAL Rossi CNP, Nursing, SW, OT.    The patient's care was discussed with the treatment team and chart notes were reviewed.       ATTESTATION      KRUNAL Hansen, PMHNP-BC, FNP-C

## 2025-01-11 NOTE — PLAN OF CARE
"Face to face shift report received from Nasim RANDHAWA. Rounding completed, pt observed sitting in lounge at the start of shift.     Pt. Endorses some anxiety this morning, denies depression, AH/VH, and SI/HI.   Pt. Denies pain. Pt. Expressed frustration and agitation  because \"men aren't as strong as they should be\".     Pt. Requested and received prn hydroxyzine at 0928. Pt. Then requested prn Zyprexa at 1203, pt. Was encouraged to utilize coping skills such as deep breathing, pt. Did some \"deep breathing, did a few push-ups, and prayed\" and then stated \"I feel better\". Pt. Did not request prn Zyprexa again. Pt. Visited with dad this morning, and then with aunt and grandma this afternoon, pt. Stated they went well. Pt. Spent majority of the shift in the lounge, socializing with peers, watching TV, and reading. Pts. Appetite is adequate. Pt. Is med compliant.     Pt. Has not had any behavioral concerns this shift.       Face to face report will be communicated to oncoming RN.    Yumi Fermin RN  1/11/2025  2:54 PM           Problem: Adult Behavioral Health Plan of Care  Goal: Patient-Specific Goal (Individualization)  Description: Patient will eat >75% of meals.   Patient will maintain ADLs without prompting.   Patient will attend >50% of groups when able.     Outcome: Progressing  Flowsheets (Taken 1/11/2025 1124)  Patient Personal Strengths:   expressive of needs   family/social support   humor   interests/hobbies     Problem: Thought Process Alteration  Goal: Optimal Thought Clarity  Description: Patient will be able have a reality based conversation by discharge.   Patient will be free of paranoia by discharge.  Patient will sleep >6 hours of sleep per night.   Outcome: Progressing     "

## 2025-01-12 PROCEDURE — 124N000001 HC R&B MH

## 2025-01-12 PROCEDURE — 99232 SBSQ HOSP IP/OBS MODERATE 35: CPT | Performed by: NURSE PRACTITIONER

## 2025-01-12 PROCEDURE — 250N000013 HC RX MED GY IP 250 OP 250 PS 637

## 2025-01-12 PROCEDURE — 250N000013 HC RX MED GY IP 250 OP 250 PS 637: Performed by: NURSE PRACTITIONER

## 2025-01-12 RX ADMIN — LAMOTRIGINE 50 MG: 25 TABLET ORAL at 20:18

## 2025-01-12 RX ADMIN — NICOTINE POLACRILEX 2 MG: 2 LOZENGE ORAL at 18:49

## 2025-01-12 RX ADMIN — NICOTINE POLACRILEX 2 MG: 2 LOZENGE ORAL at 09:02

## 2025-01-12 RX ADMIN — OLANZAPINE 10 MG: 10 TABLET, ORALLY DISINTEGRATING ORAL at 20:19

## 2025-01-12 RX ADMIN — OLANZAPINE 10 MG: 10 TABLET, ORALLY DISINTEGRATING ORAL at 08:24

## 2025-01-12 RX ADMIN — NICOTINE POLACRILEX 2 MG: 2 LOZENGE ORAL at 07:40

## 2025-01-12 RX ADMIN — OMEGA-3 FATTY ACIDS CAP 1000 MG 1 G: 1000 CAP at 08:23

## 2025-01-12 RX ADMIN — QUETIAPINE 400 MG: 100 TABLET ORAL at 20:18

## 2025-01-12 RX ADMIN — NICOTINE POLACRILEX 2 MG: 2 LOZENGE ORAL at 06:02

## 2025-01-12 RX ADMIN — Medication 1 PATCH: at 08:24

## 2025-01-12 RX ADMIN — NICOTINE POLACRILEX 2 MG: 2 LOZENGE ORAL at 11:08

## 2025-01-12 RX ADMIN — Medication 1 TABLET: at 08:23

## 2025-01-12 RX ADMIN — NICOTINE POLACRILEX 2 MG: 2 LOZENGE ORAL at 19:51

## 2025-01-12 RX ADMIN — LAMOTRIGINE 50 MG: 25 TABLET ORAL at 08:23

## 2025-01-12 RX ADMIN — NICOTINE POLACRILEX 2 MG: 2 LOZENGE ORAL at 17:30

## 2025-01-12 ASSESSMENT — ACTIVITIES OF DAILY LIVING (ADL)
LAUNDRY: UNABLE TO COMPLETE
ADLS_ACUITY_SCORE: 20
HYGIENE/GROOMING: INDEPENDENT
ADLS_ACUITY_SCORE: 20
ORAL_HYGIENE: INDEPENDENT
ADLS_ACUITY_SCORE: 20
DRESS: SCRUBS (BEHAVIORAL HEALTH)
ADLS_ACUITY_SCORE: 20

## 2025-01-12 NOTE — PLAN OF CARE
Problem: Adult Behavioral Health Plan of Care  Goal: Patient-Specific Goal (Individualization)  Description: Patient will eat >75% of meals.   Patient will maintain ADLs without prompting.   Patient will attend >50% of groups when able.     Outcome: Progressing     Problem: Thought Process Alteration  Goal: Optimal Thought Clarity  Description: Patient will be able have a reality based conversation by discharge.   Patient will be free of paranoia by discharge.  Patient will sleep >6 hours of sleep per night.   Outcome: Progressing     Face to face shift report received from Addi RANDHAWA. Rounding completed, pt observed.     Pt appeared to be sleeping at the beginning of this shift. Pt awake after 0530 rounds.    Face to face report will be communicated to oncmickey RANDHAWA.    Tiffany Lowry RN  1/12/2025  6:14 AM

## 2025-01-12 NOTE — PROGRESS NOTES
[facility] PSYCHIATRY  PROGRESS NOTE     SUBJECTIVE     Prior to interviewing the patient, I met with nursing and reviewed patient's clinical condition. We discussed clinical care both before and after the interview. I have reviewed the patient's clinical course by review of records including previous notes, labs, and vital signs.     Per nursing, the patient had the following behavioral events over the last 24-hours: Calm and cooperative.      On psychiatric interview, he is seen in [facility] area. Is feeling irritable today and wants to leave. Explored this some and he is fearful of having to stay 6 months based on court. Advised this won't happen as outpatient services are recommended. Reassurance given, advised that he is doing well. Encouraged to continue working on the self help books, pursuing his spiritual supports and going to groups.      He continues to do well with peers, sleep is stable and has a good appetite. No physical complaints. Discussing follow up after discharge, he is agreeable to an in-person IOP DBT or PHP program, individual therapy and medication management.            MEDICATIONS   Scheduled Meds:  Current Facility-Administered Medications   Medication Dose Route Frequency Provider Last Rate Last Admin     fish oil-omega-3 fatty acids capsule 1 g  1 g Oral Daily Tariq Rand APRN CNP   1 g at 01/11/25 0812     lamoTRIgine (LaMICtal) tablet 50 mg  50 mg Oral BID Mel Greene APRN CNP   50 mg at 01/11/25 2102     multivitamin w/minerals (THERA-VIT-M) tablet 1 tablet  1 tablet Oral Daily Tariq Rand APRN CNP   1 tablet at 01/11/25 0812     nicotine (NICODERM CQ) 21 MG/24HR 24 hr patch 1 patch  1 patch Transdermal Daily Dariana Daley NP   1 patch at 01/11/25 0815     OLANZapine zydis (zyPREXA) ODT tab 10 mg  10 mg Oral BID Dariana Daley NP   10 mg at 01/11/25 2102     QUEtiapine (SEROquel) tablet 400 mg  400 mg Oral At Bedtime Dariana Daley NP   400 mg at 01/11/25  "2102     PRN Meds:.  Current Facility-Administered Medications   Medication Dose Route Frequency Provider Last Rate Last Admin     acetaminophen (TYLENOL) tablet 650 mg  650 mg Oral Q4H PRN Dariana Daley NP         alum & mag hydroxide-simethicone (MAALOX) suspension 30 mL  30 mL Oral Q4H PRN Dariana Daley NP         hydrOXYzine HCl (ATARAX) tablet 50 mg  50 mg Oral Q4H PRN Dariana Daley NP   50 mg at 01/11/25 0928     melatonin tablet 3 mg  3 mg Oral At Bedtime PRN Dariana Daley NP         nicotine (COMMIT) lozenge 2 mg  2 mg Buccal Q1H PRN Mel Greene APRN CNP   2 mg at 01/12/25 0740     OLANZapine (zyPREXA) tablet 10 mg  10 mg Oral BID PRN Dariana Daley NP   10 mg at 01/10/25 1059    Or     OLANZapine (zyPREXA) injection 10 mg  10 mg Intramuscular BID PRN Dariana Daley NP            ALLERGIES   No Known Allergies     MENTAL STATUS EXAM   Vitals: /80   Pulse 78   Temp 97.9  F (36.6  C) (Temporal)   Resp 14   Ht 1.829 m (6')   Wt 109.4 kg (241 lb 1.6 oz)   SpO2 98%   BMI 32.70 kg/m      Appearance: Alert, oriented, dressed in hospital scrubs  Attitude: Cooperative  Eye Contact: Fair  Mood: \"good\"  Affect: Blunted, reduced range   Speech: Normal rate and rhythm   Psychomotor Behavior: No TD or rigidity. No tremor or akathisia.   Thought Process:  Linear conversation  Associations: No loose associations   Thought Content: Denies SI, plan, or SIB. Denies AVH.   Insight: Improving  Judgment: Fair  Oriented to: Person, place, and time  Attention Span and Concentration: Intact  Recent and Remote Memory: Intact  Language: English with appropriate syntax and vocabulary  Fund of Knowledge: Average   Muscle Strength and Tone: Grossly normal  Gait and Station: Grossly normal       LABS   No results found for this or any previous visit (from the past 24 hours).        IMPRESSION     This is a 24 year old male with a PMH of bipolar I disorder and factitious disorder with previous " hospitalizations in a similar state who presents in apparent mixed episode with psychotic features with SI. Reportedly had not slept for 3 or more days PTA and was more agitated and irritable. This could be influenced by possible medication non-compliance, subtherapeutic doses or psychosocial stressors. Likely is multifactorial. Pt did report medication compliance. It does not appear that substances are influencing presentation with negative UDS and pt report of abstinence from such substances for the last several years. Sx related to bipolar have clearly caused disruption in multiple social spheres where inpatient stabilization is recommended for further safety and stabilization. Petition for commitment through [facility] filed in ER with [facility] picking up case. Awaiting dates.      On arrival, pt leigh ann and psychosis appears to be softening with the introduction of scheduled Seroquel and Zyprexa. Pt does continue to have Evangelical preoccupations and grandiose delusions. Per ED chart, it was recommended Zyprexa be added for a brief period to aid in reduction on sx. Lamictal will likely need to be titrated. Ordered a CMP, CBC and Vit D with the CMP and CBC unremarkable. Vit D is pending. Added fish oil and MVI per pt request with fish oil adding potential benefit in leigh ann.     Today: Linear conversation, insight is improving, he's receptive to education. Compliant with medications, respectful, agrees with in-person therapy, IOP DBT or PHP program and continued medication management.        DIAGNOSES     #. Bipolar I disorder, current episode mixed with psychotic features  #. Factitious disorder by hx  #. R/O Medication Noncompliance  #. R/O Personality Disorder       PLAN     Location: Unit 5  Legal Status: Orders Placed This Encounter      Court Hold    Safety Assessment:    Behavioral Orders   Procedures     Code 1 - Restrict to Unit     Routine Programming     As clinically indicated     Status 15     Every 15  minutes.      PTA psychotropic medications held:      - Wellbutrin 150 mg daily     PTA psychotropic medications continued/changed:      - Lamictal 50 mg at bedtime -> BID dosing 1/7/2025  - Seroquel 400 mg at bedtime  - Zyprexa 10 mg BID     New medications initiated:      - Standard unit PRNs  - Fish oil 1 g daily    Today's Changes:    - None    Programming: Patient will be treated in a therapeutic milieu with appropriate individual and group therapies. Education will be provided on diagnoses, medications, and treatments.     Medical diagnoses:  Per medicine    Consult: None  Tests: CBC, CMP unremarkable. Vit D pending     Anticipated LOS: >5 days  Disposition: IOP DBT program or PHP, TBD with civil commitment        TREATMENT TEAM CARE PLAN     Progress: Continued symptoms.    Continued Stay Criteria/Rationale: Continued symptoms without sufficient improvement/resolution.    Medical/Physical: See above.    Precautions: See above.     Plan: Continue inpatient care with unit support and medication management.    Rationale for change in precautions or plan: NA due to no change.    Participants: KRUNAL Rossi CNP, Nursing, SW, OT.    The patient's care was discussed with the treatment team and chart notes were reviewed.       ATTESTATION      KRUNAL Hansen, PMHNP-BC, FNP-C

## 2025-01-12 NOTE — PLAN OF CARE
Problem: Adult Behavioral Health Plan of Care  Goal: Patient-Specific Goal (Individualization)  Description: Patient will eat >75% of meals.   Patient will maintain ADLs without prompting.   Patient will attend >50% of groups when able.   Outcome: Progressing     Problem: Thought Process Alteration  Goal: Optimal Thought Clarity  Description: Patient will be able have a reality based conversation by discharge.   Patient will be free of paranoia by discharge.  Patient will sleep >6 hours of sleep per night.   Outcome: Progressing    Face to face shift report received from previously assigned nurse. Rounding completed, pt observed in group, participating.    Patient is met with in the lounge. Denies pain, SI, HI, A/V hallucinations, and depression. Endorses anxiety, although states it is tolerable. Patient is appropriate with staff and peers, maintains appropriate boundaries. No further needs at this time.     Patient social and appropriate throughout the shift. Compliant with HS medication administration. No further needs at this time.     Face to face report communicated to oncoming RN.    Lauren Maldonado RN  1/12/2025  3:50 PM

## 2025-01-13 PROCEDURE — 99232 SBSQ HOSP IP/OBS MODERATE 35: CPT

## 2025-01-13 PROCEDURE — 124N000001 HC R&B MH

## 2025-01-13 PROCEDURE — 250N000013 HC RX MED GY IP 250 OP 250 PS 637: Performed by: NURSE PRACTITIONER

## 2025-01-13 PROCEDURE — 250N000013 HC RX MED GY IP 250 OP 250 PS 637

## 2025-01-13 RX ADMIN — NICOTINE POLACRILEX 2 MG: 2 LOZENGE ORAL at 11:28

## 2025-01-13 RX ADMIN — LAMOTRIGINE 50 MG: 25 TABLET ORAL at 08:12

## 2025-01-13 RX ADMIN — NICOTINE POLACRILEX 2 MG: 2 LOZENGE ORAL at 06:06

## 2025-01-13 RX ADMIN — NICOTINE POLACRILEX 2 MG: 2 LOZENGE ORAL at 16:23

## 2025-01-13 RX ADMIN — OLANZAPINE 10 MG: 10 TABLET, ORALLY DISINTEGRATING ORAL at 08:12

## 2025-01-13 RX ADMIN — NICOTINE POLACRILEX 2 MG: 2 LOZENGE ORAL at 08:50

## 2025-01-13 RX ADMIN — NICOTINE POLACRILEX 2 MG: 2 LOZENGE ORAL at 07:20

## 2025-01-13 RX ADMIN — OLANZAPINE 10 MG: 10 TABLET, ORALLY DISINTEGRATING ORAL at 20:20

## 2025-01-13 RX ADMIN — Medication 1 TABLET: at 08:12

## 2025-01-13 RX ADMIN — LAMOTRIGINE 50 MG: 25 TABLET ORAL at 20:20

## 2025-01-13 RX ADMIN — QUETIAPINE 400 MG: 100 TABLET ORAL at 20:20

## 2025-01-13 RX ADMIN — Medication 1 PATCH: at 08:12

## 2025-01-13 RX ADMIN — OMEGA-3 FATTY ACIDS CAP 1000 MG 1 G: 1000 CAP at 08:12

## 2025-01-13 RX ADMIN — NICOTINE POLACRILEX 2 MG: 2 LOZENGE ORAL at 12:39

## 2025-01-13 RX ADMIN — NICOTINE POLACRILEX 2 MG: 2 LOZENGE ORAL at 18:05

## 2025-01-13 RX ADMIN — NICOTINE POLACRILEX 2 MG: 2 LOZENGE ORAL at 19:44

## 2025-01-13 ASSESSMENT — ACTIVITIES OF DAILY LIVING (ADL)
ORAL_HYGIENE: INDEPENDENT
ADLS_ACUITY_SCORE: 20
ADLS_ACUITY_SCORE: 20
LAUNDRY: UNABLE TO COMPLETE
ADLS_ACUITY_SCORE: 20
DRESS: SCRUBS (BEHAVIORAL HEALTH);INDEPENDENT
ADLS_ACUITY_SCORE: 20
ORAL_HYGIENE: INDEPENDENT
ADLS_ACUITY_SCORE: 20
LAUNDRY: UNABLE TO COMPLETE
DRESS: SCRUBS (BEHAVIORAL HEALTH)
ADLS_ACUITY_SCORE: 20
HYGIENE/GROOMING: INDEPENDENT
ADLS_ACUITY_SCORE: 20
HYGIENE/GROOMING: INDEPENDENT
ADLS_ACUITY_SCORE: 20

## 2025-01-13 NOTE — PROGRESS NOTES
1:1 time with the patient.  No changes made to the discharge plan at this time. Commitment 1/16 @ TBD.

## 2025-01-13 NOTE — PROGRESS NOTES
"[facility] PSYCHIATRY  PROGRESS NOTE     SUBJECTIVE     Prior to interviewing the patient, I met with nursing and reviewed patient's clinical condition. We discussed clinical care both before and after the interview. I have reviewed the patient's clinical course by review of records including previous notes, labs, and vital signs.     Per nursing, the patient had the following behavioral events over the last 24-hours: Calm and cooperative.      On psychiatric interview, he is seen in his room. Pt notes he is doing well, though endorses a small degree of anxiety as he feels he is ready for discharge. He reports he is \"rolling with the punches\". Denies depression, SI, HI or SIB. Denies AVH.     Denies any noted adverse effects from medication, states he does not feel tired during the day like when he was on Zyprexa in the past. Discussed that Zyprexa could be short term and will follow up with outpatient provider. Pt aware court pending 1/16.     MEDICATIONS   Scheduled Meds:  Current Facility-Administered Medications   Medication Dose Route Frequency Provider Last Rate Last Admin     fish oil-omega-3 fatty acids capsule 1 g  1 g Oral Daily Tariq Rand APRN CNP   1 g at 01/13/25 0812     lamoTRIgine (LaMICtal) tablet 50 mg  50 mg Oral BID Mel Greene APRN CNP   50 mg at 01/13/25 0812     multivitamin w/minerals (THERA-VIT-M) tablet 1 tablet  1 tablet Oral Daily Tariq Rand APRN CNP   1 tablet at 01/13/25 0812     nicotine (NICODERM CQ) 21 MG/24HR 24 hr patch 1 patch  1 patch Transdermal Daily Dariana Daley NP   1 patch at 01/13/25 0812     OLANZapine zydis (zyPREXA) ODT tab 10 mg  10 mg Oral BID Dariana Daley, NP   10 mg at 01/13/25 0812     QUEtiapine (SEROquel) tablet 400 mg  400 mg Oral At Bedtime Dariana Daley, NP   400 mg at 01/12/25 2018     PRN Meds:.  Current Facility-Administered Medications   Medication Dose Route Frequency Provider Last Rate Last Admin     acetaminophen " "(TYLENOL) tablet 650 mg  650 mg Oral Q4H PRN Dariana Daley NP         alum & mag hydroxide-simethicone (MAALOX) suspension 30 mL  30 mL Oral Q4H PRN Dariana Daley NP         hydrOXYzine HCl (ATARAX) tablet 50 mg  50 mg Oral Q4H PRN Dariana Daley NP   50 mg at 01/11/25 0928     melatonin tablet 3 mg  3 mg Oral At Bedtime PRN Dariana Daley NP         nicotine (COMMIT) lozenge 2 mg  2 mg Buccal Q1H PRN Mel Greene APRN CNP   2 mg at 01/13/25 1239     OLANZapine (zyPREXA) tablet 10 mg  10 mg Oral BID PRN Dariana Daley NP   10 mg at 01/10/25 1059    Or     OLANZapine (zyPREXA) injection 10 mg  10 mg Intramuscular BID PRN Dariana Daley NP            ALLERGIES   No Known Allergies     MENTAL STATUS EXAM   Vitals: /77 (BP Location: Right arm)   Pulse 82   Temp 97.1  F (36.2  C) (Temporal)   Resp 16   Ht 1.829 m (6')   Wt 109.4 kg (241 lb 1.6 oz)   SpO2 97%   BMI 32.70 kg/m      Appearance: Alert, oriented, dressed in hospital scrubs  Attitude: Cooperative  Eye Contact: Fair  Mood: \"good\"  Affect: Blunted, reduced range   Speech: Normal rate and rhythm   Psychomotor Behavior: No TD or rigidity. No tremor or akathisia.   Thought Process:  Linear conversation  Associations: No loose associations   Thought Content: Denies SI, plan, or SIB. Denies AVH.   Insight: Improving  Judgment: Fair  Oriented to: Person, place, and time  Attention Span and Concentration: Intact  Recent and Remote Memory: Intact  Language: English with appropriate syntax and vocabulary  Fund of Knowledge: Average   Muscle Strength and Tone: Grossly normal  Gait and Station: Grossly normal       LABS   No results found for this or any previous visit (from the past 24 hours).        IMPRESSION     This is a 24 year old male with a PMH of bipolar I disorder and factitious disorder with previous hospitalizations in a similar state who presents in apparent mixed episode with psychotic features with SI. Reportedly " had not slept for 3 or more days PTA and was more agitated and irritable. This could be influenced by possible medication non-compliance, subtherapeutic doses or psychosocial stressors. Likely is multifactorial. Pt did report medication compliance. It does not appear that substances are influencing presentation with negative UDS and pt report of abstinence from such substances for the last several years. Sx related to bipolar have clearly caused disruption in multiple social spheres where inpatient stabilization is recommended for further safety and stabilization. Petition for commitment through [facility] filed in ER with [facility] picking up case. Awaiting dates.      On arrival, pt leigh ann and psychosis appears to be softening with the introduction of scheduled Seroquel and Zyprexa. Pt does continue to have Mosque preoccupations and grandiose delusions. Per ED chart, it was recommended Zyprexa be added for a brief period to aid in reduction on sx. Lamictal will likely need to be titrated. Ordered a CMP, CBC and Vit D with the CMP and CBC unremarkable. Vit D is pending. Added fish oil and MVI per pt request with fish oil adding potential benefit in leigh ann.     Today: Linear conversation, insight is improving, he's receptive to education. Compliant with medications, respectful, agrees with in-person therapy, IOP DBT or PHP program and continued medication management. Court 1/16       DIAGNOSES     #. Bipolar I disorder, current episode mixed with psychotic features  #. Factitious disorder by hx  #. R/O Medication Noncompliance  #. R/O Personality Disorder       PLAN     Location: Unit 5  Legal Status: Orders Placed This Encounter      Court Hold    Safety Assessment:    Behavioral Orders   Procedures     Code 1 - Restrict to Unit     Routine Programming     As clinically indicated     Status 15     Every 15 minutes.      PTA psychotropic medications held:      - Wellbutrin 150 mg daily     PTA psychotropic  medications continued/changed:      - Lamictal 50 mg at bedtime -> BID dosing 1/7/2025  - Seroquel 400 mg at bedtime  - Zyprexa 10 mg BID     New medications initiated:      - Standard unit PRNs  - Fish oil 1 g daily    Today's Changes:    - None    Programming: Patient will be treated in a therapeutic milieu with appropriate individual and group therapies. Education will be provided on diagnoses, medications, and treatments.     Medical diagnoses:  Per medicine    Consult: None  Tests: CBC, CMP unremarkable. Vit D unremarkable    Anticipated LOS: >5 days  Disposition: IOP DBT program or PHP, TBD with civil commitment        TREATMENT TEAM CARE PLAN     Progress: Symptoms improving.    Continued Stay Criteria/Rationale: Ongoing treatment and safe discharge planning.    Medical/Physical: See above.    Precautions: See above.     Plan: Continue inpatient care with unit support and medication management.    Rationale for change in precautions or plan: NA due to no change.    Participants: KRUNAL Murillo CNP, Nursing, SW, OT.    The patient's care was discussed with the treatment team and chart notes were reviewed.        ATTESTATION      KRUNAL Murillo CNP

## 2025-01-13 NOTE — PLAN OF CARE
Problem: Adult Behavioral Health Plan of Care  Goal: Patient-Specific Goal (Individualization)  Description: Patient will eat >75% of meals.   Patient will maintain ADLs without prompting.   Patient will attend >50% of groups when able.   Outcome: Progressing     Problem: Thought Process Alteration  Goal: Optimal Thought Clarity  Description: Patient will be able have a reality based conversation by discharge.   Patient will be free of paranoia by discharge.  Patient will sleep >6 hours of sleep per night.   Outcome: Progressing   Goal Outcome Evaluation:    Plan of Care Reviewed With: patient          Patient is pleasant and cooperative. Affect is full range, mood is calm. He denies SI, HI, anxiety, depression, hallucinations, and pain. States he's doing fine, has no concerns or requests. He shaved his face before dinner. He asked his peers if they are ready to watch the [employer] game [employer]. He attends groups this shift, and is social with his peers.   Patient received a Bible in the mail, he has it with him.  Face to face end of shift report communicated to oncoming RN.     China Pina RN  1/13/2025  10:41 PM

## 2025-01-13 NOTE — PLAN OF CARE
Problem: Adult Behavioral Health Plan of Care  Goal: Patient-Specific Goal (Individualization)  Description: Patient will eat >75% of meals.   Patient will maintain ADLs without prompting.   Patient will attend >50% of groups when able.     Outcome: Progressing     Problem: Thought Process Alteration  Goal: Optimal Thought Clarity  Description: Patient will be able have a reality based conversation by discharge.   Patient will be free of paranoia by discharge.  Patient will sleep >6 hours of sleep per night.   Outcome: Progressing     Face to face shift report received from Addi RANDHAWA. Rounding completed, pt observed.     Pt appeared to sleep a total of 6 hours on and off during the NOC.    Writer continued care of pt for the day shift. Pt pleasant and cooperative with nursing assessment. Pt is compliant with medications this shift. Pt says he is just patiently waiting for the court process to be over. Pt does socialize appropriately with peers on the unit.     Face to face report will be communicated to oncoming RN.    Tiffany Lowry RN  1/13/2025  10:50 AM

## 2025-01-13 NOTE — PLAN OF CARE
PT denies SI/HI and hallucinations. PT reports he already feels ready to discharge but is patiently waiting for the court process to end. He denies pain. Able to make needs known. Appropriate with staff and patients.       Face to face end of shift report communicated to oncoming RN     Marcella Crawley RN  1/13/2025  3:42 PM                         Problem: Adult Behavioral Health Plan of Care  Goal: Patient-Specific Goal (Individualization)  Description: Patient will eat >75% of meals.   Patient will maintain ADLs without prompting.   Patient will attend >50% of groups when able.     Outcome: Progressing     Problem: Thought Process Alteration  Goal: Optimal Thought Clarity  Description: Patient will be able have a reality based conversation by discharge.   Patient will be free of paranoia by discharge.  Patient will sleep >6 hours of sleep per night.   Outcome: Progressing   Goal Outcome Evaluation:    Plan of Care Reviewed With: patient

## 2025-01-14 PROCEDURE — 99232 SBSQ HOSP IP/OBS MODERATE 35: CPT

## 2025-01-14 PROCEDURE — 124N000001 HC R&B MH

## 2025-01-14 PROCEDURE — 250N000013 HC RX MED GY IP 250 OP 250 PS 637

## 2025-01-14 PROCEDURE — 250N000013 HC RX MED GY IP 250 OP 250 PS 637: Performed by: NURSE PRACTITIONER

## 2025-01-14 RX ADMIN — OLANZAPINE 10 MG: 10 TABLET, ORALLY DISINTEGRATING ORAL at 20:18

## 2025-01-14 RX ADMIN — LAMOTRIGINE 50 MG: 25 TABLET ORAL at 08:52

## 2025-01-14 RX ADMIN — NICOTINE POLACRILEX 2 MG: 2 LOZENGE ORAL at 06:21

## 2025-01-14 RX ADMIN — Medication 1 TABLET: at 08:52

## 2025-01-14 RX ADMIN — NICOTINE POLACRILEX 2 MG: 2 LOZENGE ORAL at 10:17

## 2025-01-14 RX ADMIN — NICOTINE POLACRILEX 2 MG: 2 LOZENGE ORAL at 14:09

## 2025-01-14 RX ADMIN — NICOTINE POLACRILEX 2 MG: 2 LOZENGE ORAL at 17:48

## 2025-01-14 RX ADMIN — OMEGA-3 FATTY ACIDS CAP 1000 MG 1 G: 1000 CAP at 08:52

## 2025-01-14 RX ADMIN — NICOTINE POLACRILEX 2 MG: 2 LOZENGE ORAL at 11:52

## 2025-01-14 RX ADMIN — Medication 1 PATCH: at 07:49

## 2025-01-14 RX ADMIN — NICOTINE POLACRILEX 2 MG: 2 LOZENGE ORAL at 20:20

## 2025-01-14 RX ADMIN — NICOTINE POLACRILEX 2 MG: 2 LOZENGE ORAL at 07:49

## 2025-01-14 RX ADMIN — QUETIAPINE 400 MG: 100 TABLET ORAL at 20:17

## 2025-01-14 RX ADMIN — OLANZAPINE 10 MG: 10 TABLET, ORALLY DISINTEGRATING ORAL at 08:52

## 2025-01-14 RX ADMIN — LAMOTRIGINE 50 MG: 25 TABLET ORAL at 20:17

## 2025-01-14 ASSESSMENT — ACTIVITIES OF DAILY LIVING (ADL)
LAUNDRY: UNABLE TO COMPLETE
ADLS_ACUITY_SCORE: 20
DRESS: INDEPENDENT
ADLS_ACUITY_SCORE: 20
ORAL_HYGIENE: INDEPENDENT
HYGIENE/GROOMING: INDEPENDENT
ADLS_ACUITY_SCORE: 20
ORAL_HYGIENE: INDEPENDENT
ADLS_ACUITY_SCORE: 20
HYGIENE/GROOMING: INDEPENDENT
ADLS_ACUITY_SCORE: 20
LAUNDRY: UNABLE TO COMPLETE
ADLS_ACUITY_SCORE: 20
ADLS_ACUITY_SCORE: 20
DRESS: SCRUBS (BEHAVIORAL HEALTH)
ADLS_ACUITY_SCORE: 20

## 2025-01-14 NOTE — PROGRESS NOTES
1:1 with pt. Pt asked if there is anything he needs to do to prepare for hearing for commitment on Thursday. Sw stated that his  will help him prepare for that if it is necessary. Pt is aware that discharge will not happen until court paperwork is received and services are in place. Pt stated he is okay with this and is not worried about having to stay until next week if necessary.

## 2025-01-14 NOTE — PLAN OF CARE
Face to face report received from China RANDHAWA. Pt. Observed.    Problem: Adult Behavioral Health Plan of Care  Goal: Patient-Specific Goal (Individualization)  Description: Patient will eat >75% of meals.   Patient will maintain ADLs without prompting.   Patient will attend >50% of groups when able.     Outcome: Progressing   Pt has been in bed with eyes closed and regular respirations for a total of 4.5 hours this noc shift. 15 minute and PRN checks all night. No complaints offered.       Face to face end of shift report communicated to oncoming RN.     Marya Wang RN  1/14/2025

## 2025-01-14 NOTE — PROGRESS NOTES
[facility] PSYCHIATRY  PROGRESS NOTE     SUBJECTIVE     Prior to interviewing the patient, I met with nursing and reviewed patient's clinical condition. We discussed clinical care both before and after the interview. I have reviewed the patient's clinical course by review of records including previous notes, labs, and vital signs.     Per nursing, the patient had the following behavioral events over the last 24-hours: Calm and cooperative.      On psychiatric interview, he is seen on the general unit. He tells me he is doing well. Waiting for court and hoping to go home after a decision. Notes he does not mind if he has to wait a few days for court decision and discharge planning. Does not want to be in the hospital long term.  Denies depression, SI, HI or SIB. Tells me yesterday he saw what looked like satan peak around the corner and then disappeared. Hasn't had anything else such as this lately. Denies feeling bothered by this and tells me he just doesn't want to be acting how he was prior to coming to the hospital. Pleasant and cooperative in conversation.     Denies any noted adverse effects from medication. Pt aware court pending 1/16.     MEDICATIONS   Scheduled Meds:  Current Facility-Administered Medications   Medication Dose Route Frequency Provider Last Rate Last Admin     fish oil-omega-3 fatty acids capsule 1 g  1 g Oral Daily Tariq Rand APRN CNP   1 g at 01/14/25 0852     lamoTRIgine (LaMICtal) tablet 50 mg  50 mg Oral BID Mel Greene APRN CNP   50 mg at 01/14/25 0852     multivitamin w/minerals (THERA-VIT-M) tablet 1 tablet  1 tablet Oral Daily Tariq Rand APRN CNP   1 tablet at 01/14/25 0852     nicotine (NICODERM CQ) 21 MG/24HR 24 hr patch 1 patch  1 patch Transdermal Daily Dariana Daley NP   1 patch at 01/14/25 0749     OLANZapine zydis (zyPREXA) ODT tab 10 mg  10 mg Oral BID Dariana Daley NP   10 mg at 01/14/25 0852     QUEtiapine (SEROquel) tablet 400 mg  400 mg Oral  "At Bedtime Dariana Daley NP   400 mg at 01/13/25 2020     PRN Meds:.  Current Facility-Administered Medications   Medication Dose Route Frequency Provider Last Rate Last Admin     acetaminophen (TYLENOL) tablet 650 mg  650 mg Oral Q4H PRN Dariana Daley NP         alum & mag hydroxide-simethicone (MAALOX) suspension 30 mL  30 mL Oral Q4H PRN Dariana aDley NP         hydrOXYzine HCl (ATARAX) tablet 50 mg  50 mg Oral Q4H PRN Dariana Daley NP   50 mg at 01/11/25 0928     melatonin tablet 3 mg  3 mg Oral At Bedtime PRN Dariana Daley NP         nicotine (COMMIT) lozenge 2 mg  2 mg Buccal Q1H PRN Mel Greene APRN CNP   2 mg at 01/14/25 1152     OLANZapine (zyPREXA) tablet 10 mg  10 mg Oral BID PRN Dariana Daley NP   10 mg at 01/10/25 1059    Or     OLANZapine (zyPREXA) injection 10 mg  10 mg Intramuscular BID PRN Dariana Daley NP            ALLERGIES   No Known Allergies     MENTAL STATUS EXAM   Vitals: /88   Pulse 74   Temp 97.2  F (36.2  C) (Temporal)   Resp 14   Ht 1.829 m (6')   Wt 109.4 kg (241 lb 1.6 oz)   SpO2 97%   BMI 32.70 kg/m      Appearance: Alert, oriented, dressed in hospital scrubs  Attitude: Cooperative  Eye Contact: Fair  Mood: \"good\"  Affect: Blunted, reduced range   Speech: Normal rate and rhythm   Psychomotor Behavior: No TD or rigidity. No tremor or akathisia.   Thought Process:  Linear conversation  Associations: No loose associations   Thought Content: Denies SI, plan, or SIB. Denies AVH.   Insight: Improving  Judgment: Fair  Oriented to: Person, place, and time  Attention Span and Concentration: Intact  Recent and Remote Memory: Intact  Language: English with appropriate syntax and vocabulary  Fund of Knowledge: Average   Muscle Strength and Tone: Grossly normal  Gait and Station: Grossly normal       LABS   No results found for this or any previous visit (from the past 24 hours).        IMPRESSION     This is a 24 year old male with a PMH of " bipolar I disorder and factitious disorder with previous hospitalizations in a similar state who presents in apparent mixed episode with psychotic features with SI. Reportedly had not slept for 3 or more days PTA and was more agitated and irritable. This could be influenced by possible medication non-compliance, subtherapeutic doses or psychosocial stressors. Likely is multifactorial. Pt did report medication compliance. It does not appear that substances are influencing presentation with negative UDS and pt report of abstinence from such substances for the last several years. Sx related to bipolar have clearly caused disruption in multiple social spheres where inpatient stabilization is recommended for further safety and stabilization. Petition for commitment through [facility] filed in ER with [facility] picking up case. Awaiting dates.      On arrival, pt leigh ann and psychosis appears to be softening with the introduction of scheduled Seroquel and Zyprexa. Pt does continue to have Judaism preoccupations and grandiose delusions. Per ED chart, it was recommended Zyprexa be added for a brief period to aid in reduction on sx. Lamictal will likely need to be titrated. Ordered a CMP, CBC and Vit D with the CMP and CBC unremarkable. Vit D is pending. Added fish oil and MVI per pt request with fish oil adding potential benefit in leigh ann.     Today: Linear conversation, insight is improving, he's receptive to education. Compliant with medications, respectful, agrees with in-person therapy, IOP DBT or PHP program and continued medication management. Court 1/16       DIAGNOSES     #. Bipolar I disorder, current episode mixed with psychotic features  #. Factitious disorder by hx  #. R/O Medication Noncompliance  #. R/O Personality Disorder       PLAN     Location: Unit 5  Legal Status: Orders Placed This Encounter      Court Hold    Safety Assessment:    Behavioral Orders   Procedures     Code 1 - Restrict to Unit     Routine  Programming     As clinically indicated     Status 15     Every 15 minutes.      PTA psychotropic medications held:      - Wellbutrin 150 mg daily     PTA psychotropic medications continued/changed:      - Lamictal 50 mg at bedtime -> BID dosing 1/7/2025  - Seroquel 400 mg at bedtime  - Zyprexa 10 mg BID     New medications initiated:      - Standard unit PRNs  - Fish oil 1 g daily    Today's Changes:    - None    Programming: Patient will be treated in a therapeutic milieu with appropriate individual and group therapies. Education will be provided on diagnoses, medications, and treatments.     Medical diagnoses:  Per medicine    Consult: None  Tests: CBC, CMP unremarkable. Vit D unremarkable    Anticipated LOS: >5 days  Disposition: IOP DBT program or PHP, TBD with civil commitment          ATTESTATION      KRUNAL Murillo CNP

## 2025-01-14 NOTE — PLAN OF CARE
Problem: Adult Behavioral Health Plan of Care  Goal: Patient-Specific Goal (Individualization)  Description: Patient will eat >75% of meals.   Patient will maintain ADLs without prompting.   Patient will attend >50% of groups when able.     Outcome: Progressing  Note: 07:35: Received end of shift report from SYDNEE Malhotra. Pt requesting thao lozenge upon arrival--full range affect, mood is calm.   14:10: Denies need for prn pharmacological intervention for the exception thao lozenges via out the shift. Pt out et about, participates in milieu activities. Denies SI/HI, hallucinations et/or pain. A&Ox4.   Face to face end of shift report to be communicated to on-coming PM staff.     Chelsy Wang RN  1/14/2025  2:14 PM        Goal: Adheres to Safety Considerations for Self and Others  Outcome: Progressing     Problem: Thought Process Alteration  Goal: Optimal Thought Clarity  Description: Patient will be able have a reality based conversation by discharge.   Patient will be free of paranoia by discharge.  Patient will sleep >6 hours of sleep per night.   Outcome: Progressing   Goal Outcome Evaluation:

## 2025-01-15 PROCEDURE — 99232 SBSQ HOSP IP/OBS MODERATE 35: CPT

## 2025-01-15 PROCEDURE — 124N000001 HC R&B MH

## 2025-01-15 PROCEDURE — 250N000013 HC RX MED GY IP 250 OP 250 PS 637: Performed by: NURSE PRACTITIONER

## 2025-01-15 PROCEDURE — 250N000013 HC RX MED GY IP 250 OP 250 PS 637

## 2025-01-15 RX ADMIN — HYDROXYZINE HYDROCHLORIDE 50 MG: 25 TABLET ORAL at 12:37

## 2025-01-15 RX ADMIN — NICOTINE POLACRILEX 2 MG: 2 LOZENGE ORAL at 06:11

## 2025-01-15 RX ADMIN — NICOTINE POLACRILEX 2 MG: 2 LOZENGE ORAL at 10:12

## 2025-01-15 RX ADMIN — NICOTINE POLACRILEX 2 MG: 2 LOZENGE ORAL at 18:59

## 2025-01-15 RX ADMIN — OLANZAPINE 10 MG: 10 TABLET, ORALLY DISINTEGRATING ORAL at 20:23

## 2025-01-15 RX ADMIN — HYDROXYZINE HYDROCHLORIDE 50 MG: 25 TABLET ORAL at 19:09

## 2025-01-15 RX ADMIN — NICOTINE POLACRILEX 2 MG: 2 LOZENGE ORAL at 07:12

## 2025-01-15 RX ADMIN — LAMOTRIGINE 50 MG: 25 TABLET ORAL at 08:23

## 2025-01-15 RX ADMIN — QUETIAPINE 400 MG: 100 TABLET ORAL at 20:23

## 2025-01-15 RX ADMIN — NICOTINE POLACRILEX 2 MG: 2 LOZENGE ORAL at 13:56

## 2025-01-15 RX ADMIN — OMEGA-3 FATTY ACIDS CAP 1000 MG 1 G: 1000 CAP at 08:23

## 2025-01-15 RX ADMIN — Medication 1 PATCH: at 08:23

## 2025-01-15 RX ADMIN — LAMOTRIGINE 50 MG: 25 TABLET ORAL at 20:23

## 2025-01-15 RX ADMIN — Medication 1 TABLET: at 08:23

## 2025-01-15 RX ADMIN — NICOTINE POLACRILEX 2 MG: 2 LOZENGE ORAL at 17:21

## 2025-01-15 RX ADMIN — OLANZAPINE 10 MG: 10 TABLET, ORALLY DISINTEGRATING ORAL at 08:23

## 2025-01-15 RX ADMIN — NICOTINE POLACRILEX 2 MG: 2 LOZENGE ORAL at 12:13

## 2025-01-15 ASSESSMENT — ACTIVITIES OF DAILY LIVING (ADL)
ADLS_ACUITY_SCORE: 20
ORAL_HYGIENE: INDEPENDENT
ADLS_ACUITY_SCORE: 20
HYGIENE/GROOMING: INDEPENDENT
ADLS_ACUITY_SCORE: 20
DRESS: SCRUBS (BEHAVIORAL HEALTH)
ADLS_ACUITY_SCORE: 20
ADLS_ACUITY_SCORE: 20
ORAL_HYGIENE: INDEPENDENT
ADLS_ACUITY_SCORE: 20
HYGIENE/GROOMING: INDEPENDENT
DRESS: INDEPENDENT
ADLS_ACUITY_SCORE: 20
ADLS_ACUITY_SCORE: 20
LAUNDRY: UNABLE TO COMPLETE
ADLS_ACUITY_SCORE: 20
LAUNDRY: UNABLE TO COMPLETE

## 2025-01-15 NOTE — PROGRESS NOTES
[facility] PSYCHIATRY  PROGRESS NOTE     SUBJECTIVE     Prior to interviewing the patient, I met with nursing and reviewed patient's clinical condition. We discussed clinical care both before and after the interview. I have reviewed the patient's clinical course by review of records including previous notes, labs, and vital signs.     Per nursing, the patient had the following behavioral events over the last 24-hours: Calm and cooperative.      On psychiatric interview, he is seen on the general unit. Notes he is doing good. States he has not had any perceptual disturbances since yesterday. Feels overall improved. Hopeful to return home once court paperwork secured. He is hoping to do some volunteering for a bit and then return to work. Had some general questions about DBT that were answered. Denies mood sx, SI, HI or SIB.     Denies any noted adverse effects from medication. Pt aware court pending 1/16.     MEDICATIONS   Scheduled Meds:  Current Facility-Administered Medications   Medication Dose Route Frequency Provider Last Rate Last Admin     fish oil-omega-3 fatty acids capsule 1 g  1 g Oral Daily Tariq Rand APRN CNP   1 g at 01/15/25 0823     lamoTRIgine (LaMICtal) tablet 50 mg  50 mg Oral BID Mel Greene APRN CNP   50 mg at 01/15/25 0823     multivitamin w/minerals (THERA-VIT-M) tablet 1 tablet  1 tablet Oral Daily Tariq Rand APRN CNP   1 tablet at 01/15/25 0823     nicotine (NICODERM CQ) 21 MG/24HR 24 hr patch 1 patch  1 patch Transdermal Daily Dariana Daley NP   1 patch at 01/15/25 0823     OLANZapine zydis (zyPREXA) ODT tab 10 mg  10 mg Oral BID Dariana Daley, NP   10 mg at 01/15/25 0823     QUEtiapine (SEROquel) tablet 400 mg  400 mg Oral At Bedtime Dariana Daley, NP   400 mg at 01/14/25 2017     PRN Meds:.  Current Facility-Administered Medications   Medication Dose Route Frequency Provider Last Rate Last Admin     acetaminophen (TYLENOL) tablet 650 mg  650 mg Oral Q4H  "PRN Dariana Daley NP         alum & mag hydroxide-simethicone (MAALOX) suspension 30 mL  30 mL Oral Q4H PRN Dariana Daley NP         hydrOXYzine HCl (ATARAX) tablet 50 mg  50 mg Oral Q4H PRN Dariana Daley NP   50 mg at 01/15/25 1237     melatonin tablet 3 mg  3 mg Oral At Bedtime PRN Dariana Daley NP         nicotine (COMMIT) lozenge 2 mg  2 mg Buccal Q1H PRN Mel Greene F, KRUNAL CNP   2 mg at 01/15/25 1213     OLANZapine (zyPREXA) tablet 10 mg  10 mg Oral BID PRN Dariana Daley NP   10 mg at 01/10/25 1059    Or     OLANZapine (zyPREXA) injection 10 mg  10 mg Intramuscular BID PRN Dariana Daley NP            ALLERGIES   No Known Allergies     MENTAL STATUS EXAM   Vitals: /79   Pulse 83   Temp 97  F (36.1  C) (Temporal)   Resp 17   Ht 1.829 m (6')   Wt 109.4 kg (241 lb 1.6 oz)   SpO2 99%   BMI 32.70 kg/m      Appearance: Alert, oriented, dressed in hospital scrubs  Attitude: Cooperative  Eye Contact: Fair  Mood: \"good\"  Affect: Blunted, reduced range   Speech: Normal rate and rhythm   Psychomotor Behavior: No TD or rigidity. No tremor or akathisia.   Thought Process:  Linear conversation  Associations: No loose associations   Thought Content: Denies SI, plan, or SIB. Denies AVH.   Insight: Improving  Judgment: Fair  Oriented to: Person, place, and time  Attention Span and Concentration: Intact  Recent and Remote Memory: Intact  Language: English with appropriate syntax and vocabulary  Fund of Knowledge: Average   Muscle Strength and Tone: Grossly normal  Gait and Station: Grossly normal       LABS   No results found for this or any previous visit (from the past 24 hours).        IMPRESSION     This is a 24 year old male with a PMH of bipolar I disorder and factitious disorder with previous hospitalizations in a similar state who presents in apparent mixed episode with psychotic features with SI. Reportedly had not slept for 3 or more days PTA and was more agitated and " irritable. This could be influenced by possible medication non-compliance, subtherapeutic doses or psychosocial stressors. Likely is multifactorial. Pt did report medication compliance. It does not appear that substances are influencing presentation with negative UDS and pt report of abstinence from such substances for the last several years. Sx related to bipolar have clearly caused disruption in multiple social spheres where inpatient stabilization is recommended for further safety and stabilization. Petition for commitment through [facility] filed in ER with [facility] picking up case. Awaiting dates.      On arrival, pt leigh ann and psychosis appears to be softening with the introduction of scheduled Seroquel and Zyprexa. Pt does continue to have Jainism preoccupations and grandiose delusions. Per ED chart, it was recommended Zyprexa be added for a brief period to aid in reduction on sx. Lamictal will likely need to be titrated. Ordered a CMP, CBC and Vit D with the CMP and CBC unremarkable. Vit D is pending. Added fish oil and MVI per pt request with fish oil adding potential benefit in leigh ann.     Today: Linear conversation, insight is improving, he's receptive to education. Compliant with medications, respectful, agrees with in-person therapy, IOP DBT or PHP program and continued medication management. Court 1/16       DIAGNOSES     #. Bipolar I disorder, current episode mixed with psychotic features  #. Factitious disorder by hx  #. R/O Medication Noncompliance  #. R/O Personality Disorder       PLAN     Location: Unit 5  Legal Status: Orders Placed This Encounter      Court Hold    Safety Assessment:    Behavioral Orders   Procedures     Code 1 - Restrict to Unit     Routine Programming     As clinically indicated     Status 15     Every 15 minutes.      PTA psychotropic medications held:      - Wellbutrin 150 mg daily     PTA psychotropic medications continued/changed:      - Lamictal 50 mg at bedtime -> BID  dosing 1/7/2025  - Seroquel 400 mg at bedtime  - Zyprexa 10 mg BID     New medications initiated:      - Standard unit PRNs  - Fish oil 1 g daily    Today's Changes:    - None    Programming: Patient will be treated in a therapeutic milieu with appropriate individual and group therapies. Education will be provided on diagnoses, medications, and treatments.     Medical diagnoses:  Per medicine    Consult: None  Tests: CBC, CMP unremarkable. Vit D unremarkable    Anticipated LOS: >5 days  Disposition: IOP DBT program or PHP, TBD with civil commitment          ATTESTATION      KRUNAL Murillo CNP

## 2025-01-15 NOTE — PLAN OF CARE
Face to face report received from Tiffany RANDHAWA. Pt. Observed.      Problem: Adult Behavioral Health Plan of Care  Goal: Patient-Specific Goal (Individualization)  Description: Patient will eat >75% of meals.   Patient will maintain ADLs without prompting.   Patient will attend >50% of groups when able.     Outcome: Progressing     Pt. Denies HI, SI, hallucinations and pain. Pt. Reporting mild/moderate anxiety and depression. Pt. In agreement to update staff to thoughts feelings of wanting to harm self others. Pt. Cooperative with medications and nursing assessment. Pt. Out to [facility], attending unit programing, social with peers and staff. Pt. Eating WDL. Pt. Denies any issues with bowel and bladder.       PRN:     hydrOXYzine HCl (ATARAX) tablet 50 mg at 1237 for c/o anxiety, with effective results.        Face to face end of shift report communicated to oncoming RN.     Marya Wang RN  1/15/2025  12:08 PM

## 2025-01-15 NOTE — PLAN OF CARE
"  Problem: Adult Behavioral Health Plan of Care  Goal: Patient-Specific Goal (Individualization)  Description: Patient will eat >75% of meals.   Patient will maintain ADLs without prompting.   Patient will attend >50% of groups when able.   Outcome: Progressing     Problem: Thought Process Alteration  Goal: Optimal Thought Clarity  Description: Patient will be able have a reality based conversation by discharge.   Patient will be free of paranoia by discharge.  Patient will sleep >6 hours of sleep per night.   Outcome: Progressing   Goal Outcome Evaluation:    Plan of Care Reviewed With: patient          Patient spends time out on the open unit, socializing with his peers and attending groups. Affect is flat, mood is calm. He denies SI, HI, anxiety, depression, hallucinations, and pain. States he feels \"okay\" about court tomorrow, states \"I'm just ready for it to be done so I can go home\".   1909-requested and accepted Atarax 50 mg for anxiety.   Face to face end of shift report communicated to oncoming RN.     China Pina RN  1/15/2025  10:21 PM               "

## 2025-01-15 NOTE — PLAN OF CARE
"  Problem: Adult Behavioral Health Plan of Care  Goal: Patient-Specific Goal (Individualization)  Description: Patient will eat >75% of meals.   Patient will maintain ADLs without prompting.   Patient will attend >50% of groups when able.   Outcome: Progressing     Problem: Thought Process Alteration  Goal: Optimal Thought Clarity  Description: Patient will be able have a reality based conversation by discharge.   Patient will be free of paranoia by discharge.  Patient will sleep >6 hours of sleep per night.   Outcome: Progressing   Goal Outcome Evaluation:    Plan of Care Reviewed With: patient          Patient was sleeping at the start of the shift. Affect is flat, mood is calm. He denies SI, HI, anxiety, depression, hallucinations, and pain. States \"I'm having a hard time with focus, like it's hard to read, I think it might be from the Zyprexa\", states this is ongoing for him, but he will discuss this with his provider tomorrow. He attends groups this shift. He is able to make his needs known, and is social with his peers.   Face to face end of shift report communicated to oncoming RN.     China Pina RN  1/14/2025  10:38 PM               "

## 2025-01-15 NOTE — PLAN OF CARE
Problem: Adult Behavioral Health Plan of Care  Goal: Patient-Specific Goal (Individualization)  Description: Patient will eat >75% of meals.   Patient will maintain ADLs without prompting.   Patient will attend >50% of groups when able.     Outcome: Progressing     Problem: Thought Process Alteration  Goal: Optimal Thought Clarity  Description: Patient will be able have a reality based conversation by discharge.   Patient will be free of paranoia by discharge.  Patient will sleep >6 hours of sleep per night.   Outcome: Progressing     Face to face shift report received from China RANDHAWA. Rounding completed, pt observed.     Pt appeared to sleep most of this shift.    Face to face report will be communicated to oncoming RN.    Tiffany Lowry RN  1/15/2025  6:23 AM

## 2025-01-16 PROCEDURE — 250N000013 HC RX MED GY IP 250 OP 250 PS 637: Performed by: NURSE PRACTITIONER

## 2025-01-16 PROCEDURE — 99232 SBSQ HOSP IP/OBS MODERATE 35: CPT

## 2025-01-16 PROCEDURE — 250N000013 HC RX MED GY IP 250 OP 250 PS 637

## 2025-01-16 PROCEDURE — 124N000001 HC R&B MH

## 2025-01-16 RX ADMIN — OMEGA-3 FATTY ACIDS CAP 1000 MG 1 G: 1000 CAP at 08:16

## 2025-01-16 RX ADMIN — OLANZAPINE 10 MG: 10 TABLET, ORALLY DISINTEGRATING ORAL at 20:12

## 2025-01-16 RX ADMIN — HYDROXYZINE HYDROCHLORIDE 50 MG: 25 TABLET ORAL at 09:38

## 2025-01-16 RX ADMIN — QUETIAPINE 400 MG: 100 TABLET ORAL at 20:12

## 2025-01-16 RX ADMIN — NICOTINE POLACRILEX 2 MG: 2 LOZENGE ORAL at 12:22

## 2025-01-16 RX ADMIN — LAMOTRIGINE 50 MG: 25 TABLET ORAL at 08:16

## 2025-01-16 RX ADMIN — Medication 1 PATCH: at 08:17

## 2025-01-16 RX ADMIN — LAMOTRIGINE 50 MG: 25 TABLET ORAL at 20:12

## 2025-01-16 RX ADMIN — NICOTINE POLACRILEX 2 MG: 2 LOZENGE ORAL at 07:18

## 2025-01-16 RX ADMIN — OLANZAPINE 10 MG: 10 TABLET, ORALLY DISINTEGRATING ORAL at 08:16

## 2025-01-16 RX ADMIN — NICOTINE POLACRILEX 2 MG: 2 LOZENGE ORAL at 06:06

## 2025-01-16 RX ADMIN — NICOTINE POLACRILEX 2 MG: 2 LOZENGE ORAL at 14:11

## 2025-01-16 RX ADMIN — NICOTINE POLACRILEX 2 MG: 2 LOZENGE ORAL at 15:49

## 2025-01-16 RX ADMIN — NICOTINE POLACRILEX 2 MG: 2 LOZENGE ORAL at 10:16

## 2025-01-16 RX ADMIN — Medication 1 TABLET: at 08:16

## 2025-01-16 RX ADMIN — NICOTINE POLACRILEX 2 MG: 2 LOZENGE ORAL at 17:35

## 2025-01-16 ASSESSMENT — ACTIVITIES OF DAILY LIVING (ADL)
ADLS_ACUITY_SCORE: 20
LAUNDRY: UNABLE TO COMPLETE
ADLS_ACUITY_SCORE: 20
DRESS: SCRUBS (BEHAVIORAL HEALTH)
ADLS_ACUITY_SCORE: 20
ORAL_HYGIENE: INDEPENDENT
ADLS_ACUITY_SCORE: 20
HYGIENE/GROOMING: INDEPENDENT
ADLS_ACUITY_SCORE: 20

## 2025-01-16 NOTE — PROGRESS NOTES
"Problem: Adult Behavioral Health Plan of Care  Goal: Patient-Specific Goal (Individualization)  Description: Patient will eat >75% of meals.   Patient will maintain ADLs without prompting.   Patient will attend >50% of groups when able.   Patient will sleep at least 6 hours at night  Patient will be medication compliant    Outcome: Progressing  Goal: Adheres to Safety Considerations for Self and Others  Outcome: Progressing  Goal: Absence of New-Onset Illness or Injury  Outcome: Progressing  Goal: Optimized Coping Skills in Response to Life Stressors  Outcome: Progressing  Goal: Develops/Participates in Therapeutic [place] to Support Successful Transition  Outcome: Progressing     Problem: Thought Process Alteration  Goal: Optimal Thought Clarity  Description: Patient will be able have a reality based conversation by discharge.   Patient will be free of paranoia by discharge.  Patient will sleep >6 hours of sleep per night.   Outcome: Progressing   Goal Outcome Evaluation:    Face to face end of shift report received from RN. Patient rounding completed, in lounge, with headphones on.  January 16, 2025 7:28 AM    Reports mood as \"excitable,\" patient denies feeling anxious or restless and reports he slept okay and sleep has not been an issue. Patient observed to be a bit restless, but cooperative. Patient denies AH/VH, but makes statement of \"more so reading between the lines in conversation\" and goes into saying how it's God working through people and makes the statement \"some may say grandiose but I know its true.\" Patient states he just wants to \"talk to people and help others.\" Patient plans on attending groups. Patient medication compliant. Patient denies SI/HI or any harmful thoughts towards self or others at this time, agrees to notify staff if anything changes.  January 16, 2025 9:14 AM    Patient requesting prn hydroxyzine for anxiety and \"still feeling excitable,\" see MAR for times. [facility], interacting with [facility].  January 16, 2025 9:41 AM    Patient attended group this morning. Patient has remained pleasant and cooperative. Patient currently laying in bed with eyes closed, even unlabored respirations noted.  January 16, 2025 11:43 AM    Patient attended group this afternoon. Patient has spent time between his room and the lounge. Patient attended court, see SW note. Patient denies any concerns or complaints at this time.  January 16, 2025 2:24 PM

## 2025-01-16 NOTE — PROGRESS NOTES
[facility] PSYCHIATRY  PROGRESS NOTE     SUBJECTIVE     Prior to interviewing the patient, I met with nursing and reviewed patient's clinical condition. We discussed clinical care both before and after the interview. I have reviewed the patient's clinical course by review of records including previous notes, labs, and vital signs.     Per nursing, the patient had the following behavioral events over the last 24-hours: Calm and cooperative.      On psychiatric interview, he is seen on the general unit. He states he is a little anxious about court, though he feels as though he has stabilized. He notes he is looking forward to discharging. Denies depression, SI, HI or SIB. Denies AVH. No delusional thinking elicited.     Denies any noted adverse effects from his medication.      MEDICATIONS   Scheduled Meds:  Current Facility-Administered Medications   Medication Dose Route Frequency Provider Last Rate Last Admin    fish oil-omega-3 fatty acids capsule 1 g  1 g Oral Daily Tariq Rand APRN CNP   1 g at 01/16/25 0816    lamoTRIgine (LaMICtal) tablet 50 mg  50 mg Oral BID Mel Greene APRN CNP   50 mg at 01/16/25 0816    multivitamin w/minerals (THERA-VIT-M) tablet 1 tablet  1 tablet Oral Daily Tariq Rand APRN CNP   1 tablet at 01/16/25 0816    nicotine (NICODERM CQ) 21 MG/24HR 24 hr patch 1 patch  1 patch Transdermal Daily Dariana Daley NP   1 patch at 01/16/25 0817    OLANZapine zydis (zyPREXA) ODT tab 10 mg  10 mg Oral BID Dariana Daley NP   10 mg at 01/16/25 0816    QUEtiapine (SEROquel) tablet 400 mg  400 mg Oral At Bedtime Dariana Daley NP   400 mg at 01/15/25 2023     PRN Meds:.  Current Facility-Administered Medications   Medication Dose Route Frequency Provider Last Rate Last Admin    acetaminophen (TYLENOL) tablet 650 mg  650 mg Oral Q4H PRN Dariana Daley NP        alum & mag hydroxide-simethicone (MAALOX) suspension 30 mL  30 mL Oral Q4H PRN Dariana Daley NP         "hydrOXYzine HCl (ATARAX) tablet 50 mg  50 mg Oral Q4H PRN Dariana Daley NP   50 mg at 01/16/25 0938    melatonin tablet 3 mg  3 mg Oral At Bedtime PRN Dariana Daley NP        nicotine (COMMIT) lozenge 2 mg  2 mg Buccal Q1H PRN Mel Greene APRN CNP   2 mg at 01/16/25 0718    OLANZapine (zyPREXA) tablet 10 mg  10 mg Oral BID PRN Dariana Daley NP   10 mg at 01/10/25 1059    Or    OLANZapine (zyPREXA) injection 10 mg  10 mg Intramuscular BID PRN Dariana Daley NP            ALLERGIES   No Known Allergies     MENTAL STATUS EXAM   Vitals: /78 (BP Location: Right arm)   Pulse 82   Temp 97.2  F (36.2  C) (Temporal)   Resp 16   Ht 1.829 m (6')   Wt 109.4 kg (241 lb 1.6 oz)   SpO2 98%   BMI 32.70 kg/m      Appearance: Alert, oriented, dressed in hospital scrubs  Attitude: Cooperative  Eye Contact: Fair  Mood: \"good\"  Affect: Blunted, reduced range   Speech: Normal rate and rhythm   Psychomotor Behavior: No TD or rigidity. No tremor or akathisia.   Thought Process:  Linear conversation  Associations: No loose associations   Thought Content: Denies SI, plan, or SIB. Denies AVH.   Insight: Improving  Judgment: Fair  Oriented to: Person, place, and time  Attention Span and Concentration: Intact  Recent and Remote Memory: Intact  Language: English with appropriate syntax and vocabulary  Fund of Knowledge: Average   Muscle Strength and Tone: Grossly normal  Gait and Station: Grossly normal       LABS   No results found for this or any previous visit (from the past 24 hours).        IMPRESSION     This is a 24 year old male with a PMH of bipolar I disorder and factitious disorder with previous hospitalizations in a similar state who presents in apparent mixed episode with psychotic features with SI. Reportedly had not slept for 3 or more days PTA and was more agitated and irritable. This could be influenced by possible medication non-compliance, subtherapeutic doses or psychosocial stressors. " Likely is multifactorial. Pt did report medication compliance. It does not appear that substances are influencing presentation with negative UDS and pt report of abstinence from such substances for the last several years. Sx related to bipolar have clearly caused disruption in multiple social spheres where inpatient stabilization is recommended for further safety and stabilization. Petition for commitment through [facility] filed in ER with [facility] picking up case. Awaiting dates.      On arrival, pt leigh ann and psychosis appears to be softening with the introduction of scheduled Seroquel and Zyprexa. Pt does continue to have Jewish preoccupations and grandiose delusions. Per ED chart, it was recommended Zyprexa be added for a brief period to aid in reduction on sx. Lamictal will likely need to be titrated. Ordered a CMP, CBC and Vit D with the CMP and CBC unremarkable. Vit D is pending. Added fish oil and MVI per pt request with fish oil adding potential benefit in leigh ann.     Today: Linear conversation, insight is improving. Compliant with medications, respectful, agrees with in-person therapy, IOP DBT or PHP program and continued medication management. Court 1/16, notes he is a little anxious.        DIAGNOSES     #. Bipolar I disorder, current episode mixed with psychotic features  #. Factitious disorder by hx  #. R/O Medication Noncompliance  #. R/O Personality Disorder       PLAN     Location: Unit 5  Legal Status: Orders Placed This Encounter      Court Hold    Safety Assessment:    Behavioral Orders   Procedures    Code 1 - Restrict to Unit    Routine Programming     As clinically indicated    Status 15     Every 15 minutes.      PTA psychotropic medications held:      - Wellbutrin 150 mg daily     PTA psychotropic medications continued/changed:      - Lamictal 50 mg at bedtime -> BID dosing 1/7/2025  - Seroquel 400 mg at bedtime  - Zyprexa 10 mg BID     New medications initiated:      - Standard unit  PRNs  - Fish oil 1 g daily    Today's Changes:    - None    Programming: Patient will be treated in a therapeutic milieu with appropriate individual and group therapies. Education will be provided on diagnoses, medications, and treatments.     Medical diagnoses:  Per medicine    Consult: None  Tests: CBC, CMP unremarkable. Vit D unremarkable    Anticipated LOS: >5 days  Disposition: IOP DBT program or PHP, TBD with civil commitment          ATTESTATION      KRUNAL Murillo CNP

## 2025-01-16 NOTE — PLAN OF CARE
"  Problem: Adult Behavioral Health Plan of Care  Goal: Patient-Specific Goal (Individualization)  Description: Patient will eat >75% of meals.   Patient will maintain ADLs without prompting.   Patient will attend >50% of groups when able.   Patient will sleep at least 6 hours at night  Patient will be medication compliant  Outcome: Progressing     Problem: Thought Process Alteration  Goal: Optimal Thought Clarity  Description: Patient will be able have a reality based conversation by discharge.   Patient will be free of paranoia by discharge.  Patient will sleep >6 hours of sleep per night.   Outcome: Progressing   Goal Outcome Evaluation:    Plan of Care Reviewed With: patient          Patient is pleasant and cooperative. Affect is full range, mood is calm. He denies SI, HI, anxiety, depression, hallucinations, and pain. States he's \"doing pretty good\". He is happy about the outcome of court today. He spends time in the [place] area playing Monopoly with his peers, and attends groups this shift.   Face to face end of shift report communicated to oncoming RN.     China Pina RN  1/16/2025  10:34 PM               "

## 2025-01-16 NOTE — PROGRESS NOTES
Pt had court today. They agreed to a stay of commitment.  stated order would be signed by her later today. Sw stated that order will most likely be delivered tomorrow and not today and paperwork with pt's  and with sw will need to be done before discharge.       [facility]  Psychiatry- Dr. Muir- 1/23- 3:25pm Virtual appointment  [facility]- Referral for [facility]. They will call once there is an opening.   Phone: [phone]  Fax: [phone]

## 2025-01-16 NOTE — PLAN OF CARE
Problem: Adult Behavioral Health Plan of Care  Goal: Patient-Specific Goal (Individualization)  Description: Patient will eat >75% of meals.   Patient will maintain ADLs without prompting.   Patient will attend >50% of groups when able.     Outcome: Progressing     Problem: Thought Process Alteration  Goal: Optimal Thought Clarity  Description: Patient will be able have a reality based conversation by discharge.   Patient will be free of paranoia by discharge.  Patient will sleep >6 hours of sleep per night.   Outcome: Progressing     Face to face shift report received from China RANDHAWA. Rounding completed, pt observed.     Pt appeared to be sleeping at the beginning of this shift. Pt awake after 0500 rounds.     Face to face report will be communicated to oncoming RN.    Tiffany Lowry RN  1/16/2025  6:09 AM

## 2025-01-17 VITALS
DIASTOLIC BLOOD PRESSURE: 78 MMHG | TEMPERATURE: 97.2 F | RESPIRATION RATE: 14 BRPM | BODY MASS INDEX: 32.66 KG/M2 | HEART RATE: 80 BPM | HEIGHT: 72 IN | OXYGEN SATURATION: 96 % | WEIGHT: 241.1 LBS | SYSTOLIC BLOOD PRESSURE: 134 MMHG

## 2025-01-17 PROCEDURE — 99239 HOSP IP/OBS DSCHRG MGMT >30: CPT

## 2025-01-17 PROCEDURE — 250N000013 HC RX MED GY IP 250 OP 250 PS 637

## 2025-01-17 PROCEDURE — 250N000013 HC RX MED GY IP 250 OP 250 PS 637: Performed by: NURSE PRACTITIONER

## 2025-01-17 RX ORDER — OLANZAPINE 10 MG/1
10 TABLET, ORALLY DISINTEGRATING ORAL 2 TIMES DAILY
Qty: 60 TABLET | Refills: 1 | Status: SHIPPED | OUTPATIENT
Start: 2025-01-17

## 2025-01-17 RX ORDER — HYDROXYZINE HYDROCHLORIDE 50 MG/1
50 TABLET, FILM COATED ORAL 2 TIMES DAILY PRN
Qty: 60 TABLET | Refills: 1 | Status: SHIPPED | OUTPATIENT
Start: 2025-01-17

## 2025-01-17 RX ORDER — MULTIPLE VITAMINS W/ MINERALS TAB 9MG-400MCG
1 TAB ORAL DAILY
COMMUNITY
Start: 2025-01-18

## 2025-01-17 RX ORDER — LAMOTRIGINE 25 MG/1
50 TABLET ORAL 2 TIMES DAILY
Qty: 60 TABLET | Refills: 2 | Status: SHIPPED | OUTPATIENT
Start: 2025-01-17

## 2025-01-17 RX ORDER — QUETIAPINE FUMARATE 400 MG/1
400 TABLET, FILM COATED ORAL AT BEDTIME
Qty: 30 TABLET | Refills: 3 | Status: SHIPPED | OUTPATIENT
Start: 2025-01-17

## 2025-01-17 RX ORDER — CHLORAL HYDRATE 500 MG
1 CAPSULE ORAL DAILY
COMMUNITY
Start: 2025-01-18

## 2025-01-17 RX ADMIN — NICOTINE POLACRILEX 2 MG: 2 LOZENGE ORAL at 07:25

## 2025-01-17 RX ADMIN — NICOTINE POLACRILEX 2 MG: 2 LOZENGE ORAL at 12:36

## 2025-01-17 RX ADMIN — Medication 1 PATCH: at 08:00

## 2025-01-17 RX ADMIN — LAMOTRIGINE 50 MG: 25 TABLET ORAL at 08:00

## 2025-01-17 RX ADMIN — OLANZAPINE 10 MG: 10 TABLET, ORALLY DISINTEGRATING ORAL at 08:00

## 2025-01-17 RX ADMIN — HYDROXYZINE HYDROCHLORIDE 50 MG: 25 TABLET ORAL at 09:36

## 2025-01-17 RX ADMIN — OMEGA-3 FATTY ACIDS CAP 1000 MG 1 G: 1000 CAP at 08:00

## 2025-01-17 RX ADMIN — NICOTINE POLACRILEX 2 MG: 2 LOZENGE ORAL at 06:04

## 2025-01-17 RX ADMIN — NICOTINE POLACRILEX 2 MG: 2 LOZENGE ORAL at 08:39

## 2025-01-17 RX ADMIN — NICOTINE POLACRILEX 2 MG: 2 LOZENGE ORAL at 09:39

## 2025-01-17 RX ADMIN — Medication 1 TABLET: at 08:00

## 2025-01-17 ASSESSMENT — ACTIVITIES OF DAILY LIVING (ADL)
ADLS_ACUITY_SCORE: 20

## 2025-01-17 NOTE — PLAN OF CARE
Problem: Adult Behavioral Health Plan of Care  Goal: Patient-Specific Goal (Individualization)  Description: Patient will eat >75% of meals.   Patient will maintain ADLs without prompting.   Patient will attend >50% of groups when able.   Patient will sleep at least 6 hours at night  Patient will be medication compliant    Outcome: Progressing     Problem: Thought Process Alteration  Goal: Optimal Thought Clarity  Description: Patient will be able have a reality based conversation by discharge.   Patient will be free of paranoia by discharge.  Patient will sleep >6 hours of sleep per night.   Outcome: Progressing     Face to face shift report received from China RANDHAWA. Rounding completed, pt observed.    Pt appeared to be sleeping at the beginning of this shift. Pt awake after 0400 rounds.    Face to face report will be communicated to oncoming RN.    Tiffany Lowry RN  1/17/2025  6:23 AM

## 2025-01-17 NOTE — PROGRESS NOTES
Sw went over after care plan with pt and . Pt had meeting with cm to go over paperwork they needed.     Court paperwork came in. Marilin called insurance for ride but they are not covered. There is no bus available today. [employer] can provide ride at 1pm.

## 2025-01-17 NOTE — DISCHARGE SUMMARY
[facility] PSYCHIATRY  DISCHARGE SUMMARY     DISCHARGE DATA     Brian Collins MRN# [id number]   Age: 24 year old YOB: 2000     Date of Admission: 1/4/2025  Date of Discharge: January 17, 2025  Discharge Provider: KRUNAL Murillo CNP       REASON FOR ADMISSION     This is a 24 year old male with a PMH of bipolar I disorder and factitious disorder with previous hospitalizations in a similar state who presents in apparent mixed episode with psychotic features with SI. Reportedly had not slept for 3 or more days PTA and was more agitated and irritable. This could be influenced by possible medication non-compliance, subtherapeutic doses or psychosocial stressors. Likely is multifactorial. Pt did report medication compliance. It does not appear that substances are influencing presentation with negative UDS and pt report of abstinence from such substances for the last several years. Sx related to bipolar have clearly caused disruption in multiple social spheres where inpatient stabilization is recommended for further safety and stabilization. Petition for commitment through [facility] filed in ER with [facility] picking up case. Awaiting dates.      On arrival, pt leigh ann and psychosis appears to be softening with the introduction of scheduled Seroquel and Zyprexa. Pt does continue to have Holiness preoccupations and grandiose delusions. Per ED chart, it was recommended Zyprexa be added for a brief period to aid in reduction on sx. Lamictal will likely need to be titrated. Ordered a CMP, CBC and Vit D with the CMP and CBC unremarkable. Vit D is pending. Added fish oil and MVI per pt request with fish oil adding potential benefit in leigh ann.     See full H&P 1/5/25 from Tariq Rand NP     DISCHARGE DIAGNOSES     #. Bipolar I disorder, current episode mixed with psychotic features  #. Factitious disorder by hx  #. R/O Medication Noncompliance  #. R/O Personality Disorder       CONSULTS     none        HOSPITAL COURSE     Legal status: Stay of commitment    Patient was admitted to unit 5 due to the aforementioned presentation. The patient was placed under 15 minute checks to ensure patient safety. The patient participated in unit programming and groups as able.    Mr. Collins did not require seclusion/restraint during hospitalization.     We reviewed with Mr. Collins current and past medication trials including duration, dose, response and side effects. During this hospitalization, the following changes to the patient's psychotropic medications were made:    PTA psychotropic medications stopped:     - Wellbutrin 150 mg daily     PTA psychotropic medications continued/changed:     - Lamictal 50 mg at bedtime -> BID dosing 1/7/2025  - Seroquel 400 mg at bedtime  - Zyprexa 10 mg BID    New psychotropic medications tried and stopped:     - none    New psychotropic medications initiated:     - Fish oil 1 g daily     Brian Fish was admitted to [facility] for the above presentation with . Pt did arrive from [facility] with petition for commitment through [facility]. A stay of commitment was granted 1/17/25.  the above PTA medications prescribed, which were continued. He requested a MVI and fish oils, which were also added with the latter having potential benefits in the context of bipolar disorder. Pt mood, leigh ann and psychotic features appeared improved upon arrival with minimal grandiosity noted (pt mentioned they want to volunteer at homeless shelter and then turn universities to rehab facilities). It was recommended pt continued with Zyprexa at this time and likely taper to discontinuation with his outpatient provider. It is possible Lamictal would need to be titrated in the outpatient setting. Pt denied SI, HI or SIB prior to discharge. The treatment team agreed pt symptoms appeared improved having an optimized discharge plan and therefore appropriate for dismissal  with stay of commitment in place.     With these changes and supports the patient noticed improvement in their symptoms and felt sufficiently ready for discharge. As a result, Brian Collins was discharged. At the time of discharge, Brian Collins was determined to not be a danger to self or others. The patient was also medically stable for discharge. At the current time of discharge, the patient does not meet criteria for involuntary hospitalization. On the day of discharge, the patient reports that they do not have suicidal or homicidal ideation. Steps taken to minimize risk include: assessing patient s behavior and thought process daily during hospital stay, discharging patient with adequate plan for follow up for mental and physical health and discussing safety plan of returning to the hospital should the patient ever have thoughts of harming themselves or others. Therefore, based on all available evidence including the factors cited above, the patient does not appear to be at imminent risk for self-harm, and is appropriate for outpatient level of care. However, if patient uses substances or is medication non-adherent, their risk of decompensation and SI will be elevated. This was discussed with the patient.       DISCHARGE MEDICATIONS     Current Discharge Medication List        START taking these medications    Details   !! fish oil-omega-3 fatty acids 1000 MG capsule Take 1 capsule (1 g) by mouth daily.    Associated Diagnoses: Bipolar I disorder with leigh ann (H)      hydrOXYzine HCl (ATARAX) 50 MG tablet Take 1 tablet (50 mg) by mouth 2 times daily as needed for anxiety.  Qty: 60 tablet, Refills: 1    Associated Diagnoses: Anxiety      multivitamin w/minerals (THERA-VIT-M) tablet Take 1 tablet by mouth daily.    Associated Diagnoses: Healthcare maintenance      OLANZapine zydis (ZYPREXA) 10 MG ODT Take 1 tablet (10 mg) by mouth 2 times daily.  Qty: 60 tablet, Refills: 1    Associated Diagnoses: Bipolar I  "disorder with leigh ann (H)       !! - Potential duplicate medications found. Please discuss with provider.        CONTINUE these medications which have CHANGED    Details   lamoTRIgine (LAMICTAL) 25 MG tablet Take 2 tablets (50 mg) by mouth 2 times daily.  Qty: 60 tablet, Refills: 2    Associated Diagnoses: Bipolar I disorder with leigh ann (H)      QUEtiapine (SEROQUEL) 400 MG tablet Take 1 tablet (400 mg) by mouth at bedtime.  Qty: 30 tablet, Refills: 3    Associated Diagnoses: Bipolar I disorder with leigh ann (H)           CONTINUE these medications which have NOT CHANGED    Details   Creatine POWD Take 1 teaspoonful by mouth daily.      MAGNESIUM PO Take 4 capsules by mouth daily. (L-threonate)      !! Omega-3 Fatty Acids (FISH OIL PO) Take 2 capsules by mouth daily.      OVER-THE-COUNTER Take 1 Tablespoonful by mouth daily. AG1: greens + probiotic      Vitamin D-Vitamin K (VITAMIN K2-VITAMIN D3 PO) Take 1 drop by mouth daily.       !! - Potential duplicate medications found. Please discuss with provider.        STOP taking these medications       buPROPion (WELLBUTRIN XL) 150 MG 24 hr tablet Comments:   Reason for Stopping:             Reason for two or more neuroleptics: Pt mood, leigh ann and psychotic features stabilized with dual neuroleptic therapy and therefore appropriate for treatment.         MENTAL STATUS EXAM   Vitals: /78   Pulse 80   Temp 97.2  F (36.2  C) (Temporal)   Resp 14   Ht 1.829 m (6')   Wt 109.4 kg (241 lb 1.6 oz)   SpO2 96%   BMI 32.70 kg/m      Appearance: Alert, oriented, dressed in hospital scrubs, appears stated age   Attitude: Cooperative   Eye Contact: Good  Mood: \"Better\"  Affect: Full range of affect  Speech: Normal rate and rhythm   Psychomotor Behavior: No tremor, rigidity, or psychomotor abnormality   Thought Process: Logical, goal directed   Associations: No loose associations   Thought Content: Denies SI or plan. No SIB. Denies A/V hallucinations. Delusional thought not " present. Minimal grandiosity.  Insight: fair  Judgment: Good  Oriented to: Person, place, and time  Attention Span and Concentration: Intact  Recent and Remote Memory: Intact  Language: English with appropriate syntax and vocabulary  Fund of Knowledge: Average  Muscle Strength and Tone: Grossly normal  Gait and Station: Grossly normal       DISCHARGE PLAN     1.  Education given regarding diagnostic and treatment options with risks, benefits and alternatives with adequate verbalization of understanding.  2.  Discharge to home. Upon detailed review of risk factors, patient amenable for release.   3.  Continue aforementioned medications and associated medication changes with follow-up by outpatient provider.  4.  Crisis management planning in place.    5.  Nursing and  to review further discharge recommendations.   6.  Patient is being discharged with the following appointments as detailed below.    [facility]  Psychiatry- Dr. Muir- 1/23- 3:25pm Virtual appointment  [facility]- Referral for [facility]. They will call once there is an opening.   Phone: [phone]  Fax: [phone]           DISCHARGE SERVICES PROVIDED     40 minutes spent on discharge services, including:  Final examination of patient.  Review and discussion of hospital stay.  Instructions for continued outpatient care/goals.  Preparation of discharge records.  Preparation of medications refills and new prescriptions.  Preparation of applicable referral forms.        ATTESTATION     KRUNAL Murillo CNP       LABS THIS ADMISSION     Results for orders placed or performed during the hospital encounter of 01/04/25   Comprehensive metabolic panel     Status: Abnormal   Result Value Ref Range    Sodium 138 135 - 145 mmol/L    Potassium 4.3 3.4 - 5.3 mmol/L    Carbon Dioxide (CO2) 23 22 - 29 mmol/L    Anion Gap 11 7 - 15 mmol/L    Urea Nitrogen 12.5 6.0 - 20.0 mg/dL    Creatinine 0.90 0.67 - 1.17 mg/dL    GFR  Estimate >90 >60 mL/min/1.73m2    Calcium 9.5 8.8 - 10.4 mg/dL    Chloride 104 98 - 107 mmol/L    Glucose 102 (H) 70 - 99 mg/dL    Alkaline Phosphatase 76 40 - 150 U/L    AST 31 0 - 45 U/L    ALT 48 0 - 70 U/L    Protein Total 7.4 6.4 - 8.3 g/dL    Albumin 4.6 3.5 - 5.2 g/dL    Bilirubin Total 0.4 <=1.2 mg/dL   Vitamin D Deficiency     Status: Normal   Result Value Ref Range    Vitamin D, Total (25-Hydroxy) 24 20 - 50 ng/mL    Narrative    Season, race, dietary intake, and treatment affect the concentration of 25-hydroxy-Vitamin D. Values may decrease during winter months and increase during summer months.    Vitamin D determination is routinely performed by an immunoassay specific for 25 hydroxyvitamin D3.  If an individual is on vitamin D2(ergocalciferol) supplementation, please specify 25 OH vitamin D2 and D3 level determination by LCMSMS test VITD23.     CBC with platelets and differential     Status: None   Result Value Ref Range    WBC Count 5.6 4.0 - 11.0 10e3/uL    RBC Count 5.07 4.40 - 5.90 10e6/uL    Hemoglobin 16.3 13.3 - 17.7 g/dL    Hematocrit 44.7 40.0 - 53.0 %    MCV 88 78 - 100 fL    MCH 32.1 26.5 - 33.0 pg    MCHC 36.5 31.5 - 36.5 g/dL    RDW 11.3 10.0 - 15.0 %    Platelet Count 241 150 - 450 10e3/uL    % Neutrophils 66 %    % Lymphocytes 23 %    % Monocytes 7 %    % Eosinophils 2 %    % Basophils 1 %    % Immature Granulocytes 0 %    NRBCs per 100 WBC 0 <1 /100    Absolute Neutrophils 3.7 1.6 - 8.3 10e3/uL    Absolute Lymphocytes 1.3 0.8 - 5.3 10e3/uL    Absolute Monocytes 0.4 0.0 - 1.3 10e3/uL    Absolute Eosinophils 0.1 0.0 - 0.7 10e3/uL    Absolute Basophils 0.1 0.0 - 0.2 10e3/uL    Absolute Immature Granulocytes 0.0 <=0.4 10e3/uL    Absolute NRBCs 0.0 10e3/uL   CBC with Platelets & Differential     Status: None    Narrative    The following orders were created for panel order CBC with Platelets & Differential.  Procedure                               Abnormality         Status                      ---------                               -----------         ------                     CBC with platelets and d...[[id number]]                      Final result                 Please view results for these tests on the individual orders.

## 2025-01-17 NOTE — PROGRESS NOTES
"Problem: Adult Behavioral Health Plan of Care  Goal: Plan of Care Review  Outcome: Progressing  Goal: Patient-Specific Goal (Individualization)  Description: Patient will eat >75% of meals.   Patient will maintain ADLs without prompting.   Patient will attend >50% of groups when able.   Patient will sleep at least 6 hours at night  Patient will be medication compliant    Outcome: Progressing  Goal: Adheres to Safety Considerations for Self and Others  Outcome: Progressing  Goal: Absence of New-Onset Illness or Injury  Outcome: Progressing  Goal: Optimized Coping Skills in Response to Life Stressors  Outcome: Progressing  Goal: Develops/Participates in Therapeutic [place] to Support Successful Transition  Outcome: Progressing     Problem: Thought Process Alteration  Goal: Optimal Thought Clarity  Description: Patient will be able have a reality based conversation by discharge.   Patient will be free of paranoia by discharge.  Patient will sleep >6 hours of sleep per night.   Outcome: Progressing   Goal Outcome Evaluation:    Face to face end of shift report received from RN. Patient rounding completed, in the [facility].  January 17, 2025 7:22 AM    Patient denies pain. Patient reports \"decent sleep, like 6 hours.\" Patient reports mood as pretty good with \"normal energy.\" Patient denies AH/VH, SI/HI, or any harmful thoughts towards self or others at this time, agrees to notify staff if anything changes. Patient is feeling ready to discharge, hoping to leave today or soon. Patient is calm, cooperative, and pleasant. Patient does not report any concerns or complaints at this time.  January 17, 2025 7:29 AM    Patient requesting prn hydroxyzine for anxiety, see MAR for times.   January 17, 2025 9:40 AM    Patient attended group this morning and has spent time between the [facility] and his room. Patient received court paperwork, just waiting for a ride home, see  note. Patient has remained calm, cooperative, and pleasant. "   January 17, 2025 11:14 AM    Discharge Note    Patient Discharged to home on 1/17/2025 1:04 PM via [employer] accompanied by  and  Staff walked patient out.     Patient informed of discharge instructions in AVS. patient verbalizes understanding and denies having any questions pertaining to AVS. Patient stable at time of discharge. Patient denies SI, HI, and thoughts of self harm at time of discharge. All personal belongings returned to patient. Discharge prescriptions sent to [facility] in [place], [place] via electronic communication.

## 2025-02-16 ENCOUNTER — HEALTH MAINTENANCE LETTER (OUTPATIENT)
Age: 25
End: 2025-02-16